# Patient Record
Sex: FEMALE | Race: WHITE | NOT HISPANIC OR LATINO | Employment: FULL TIME | URBAN - METROPOLITAN AREA
[De-identification: names, ages, dates, MRNs, and addresses within clinical notes are randomized per-mention and may not be internally consistent; named-entity substitution may affect disease eponyms.]

---

## 2017-05-17 ENCOUNTER — GENERIC CONVERSION - ENCOUNTER (OUTPATIENT)
Dept: OTHER | Facility: OTHER | Age: 42
End: 2017-05-17

## 2017-05-24 ENCOUNTER — GENERIC CONVERSION - ENCOUNTER (OUTPATIENT)
Dept: OTHER | Facility: OTHER | Age: 42
End: 2017-05-24

## 2018-02-14 ENCOUNTER — HOSPITAL ENCOUNTER (EMERGENCY)
Facility: HOSPITAL | Age: 43
Discharge: HOME | End: 2018-02-14
Attending: EMERGENCY MEDICINE | Admitting: EMERGENCY MEDICINE

## 2018-02-14 ENCOUNTER — APPOINTMENT (EMERGENCY)
Dept: RADIOLOGY | Facility: HOSPITAL | Age: 43
End: 2018-02-14

## 2018-02-14 VITALS
SYSTOLIC BLOOD PRESSURE: 170 MMHG | RESPIRATION RATE: 18 BRPM | DIASTOLIC BLOOD PRESSURE: 82 MMHG | WEIGHT: 160 LBS | TEMPERATURE: 98.4 F | BODY MASS INDEX: 26.63 KG/M2 | HEART RATE: 74 BPM | OXYGEN SATURATION: 99 %

## 2018-02-14 DIAGNOSIS — I10 HYPERTENSION: ICD-10-CM

## 2018-02-14 DIAGNOSIS — S89.92XA INJURY OF LEFT KNEE, INITIAL ENCOUNTER: Primary | ICD-10-CM

## 2018-02-14 DIAGNOSIS — M19.90 OSTEOARTHRITIS: ICD-10-CM

## 2018-02-14 PROCEDURE — 73562 X-RAY EXAM OF KNEE 3: CPT

## 2018-02-14 PROCEDURE — 99283 EMERGENCY DEPT VISIT LOW MDM: CPT

## 2018-02-14 RX ORDER — AMLODIPINE BESYLATE 2.5 MG/1
5 TABLET ORAL DAILY
Qty: 30 TABLET | Refills: 0 | Status: SHIPPED | OUTPATIENT
Start: 2018-02-14 | End: 2018-02-14

## 2018-02-14 RX ORDER — AMLODIPINE BESYLATE 5 MG/1
5 TABLET ORAL DAILY
Qty: 30 TABLET | Refills: 0 | Status: SHIPPED | OUTPATIENT
Start: 2018-02-14 | End: 2021-03-24 | Stop reason: ALTCHOICE

## 2018-02-14 RX ORDER — OXYCODONE HYDROCHLORIDE AND ACETAMINOPHEN 5; 325 MG/1; MG/1
1 TABLET ORAL EVERY 4 HOURS PRN
Qty: 15 TABLET | Refills: 0 | Status: SHIPPED | OUTPATIENT
Start: 2018-02-14 | End: 2018-02-19

## 2018-02-14 RX ORDER — NAPROXEN 500 MG/1
500 TABLET ORAL 2 TIMES DAILY WITH MEALS
Qty: 20 TABLET | Refills: 0 | Status: SHIPPED | OUTPATIENT
Start: 2018-02-14 | End: 2021-03-24 | Stop reason: ALTCHOICE

## 2018-02-14 NOTE — ED PROVIDER NOTES
History  Chief Complaint   Patient presents with    Knee Pain     Pt reports left knee pain starting Saturday  Hx of ACL reconstruction "many years ago" denies injury/trauma     53-year-old female presents for evaluation of left knee pain  She states that she had ACL injury and reconstruction approximately 20 years ago, 10 years ago she needed a revision  She has not followed up with an orthopedist in years  She denies any acute injury to her knee however on Saturday she started to have pain with ambulation, pain with any pressure to the knee especially in the medial aspect of it  There is no discoloration, no neurovascular dysfunction noted  She states that there is more inflammation around the screw on the medial tibia  No fevers or chills, no redness, no joint effusion is noted  None       Past Medical History:   Diagnosis Date    Cancer [facility])     breast    Hypertension        History reviewed  No pertinent surgical history  No family history on file  I have reviewed and agree with the history as documented  Social History   Substance Use Topics    Smoking status: Current Every Day Smoker     Packs/day: 0 50    Smokeless tobacco: Never Used    Alcohol use No        Review of Systems   Constitutional: Negative for chills and fever  Respiratory: Negative for cough, chest tightness and shortness of breath  Cardiovascular: Negative for chest pain and palpitations  Gastrointestinal: Negative for abdominal pain, nausea and vomiting  Musculoskeletal: Positive for arthralgias (Left knee) and gait problem  Negative for back pain, joint swelling and neck pain  Skin: Negative for color change, rash and wound  Allergic/Immunologic: Negative for immunocompromised state  Neurological: Negative for dizziness, syncope, weakness, numbness and headaches         Physical Exam  ED Triage Vitals [02/14/18 1241]   Temperature Pulse Respirations Blood Pressure SpO2   98 4 °F (36 9 °C) 88 16 (!) 203/100 100 %      Temp Source Heart Rate Source Patient Position - Orthostatic VS BP Location FiO2 (%)   Oral Monitor Sitting Left arm --      Pain Score       8           Orthostatic Vital Signs  Vitals:    02/14/18 1241 02/14/18 1517   BP: (!) 203/100 170/82   Pulse: 88 74   Patient Position - Orthostatic VS: Sitting Lying       Physical Exam   Constitutional: She is oriented to person, place, and time  She appears well-developed and well-nourished  No distress  HENT:   Head: Normocephalic and atraumatic  Mouth/Throat: Oropharynx is clear and moist    Eyes: Conjunctivae and EOM are normal    Neck: Normal range of motion  Cardiovascular: Normal rate and regular rhythm  Pulmonary/Chest: Effort normal  No respiratory distress  Musculoskeletal: She exhibits tenderness (L knee - tenderness over medial meniscus  normal ROM but pain w pressure to the knee  Drawer tests are normal)  She exhibits no edema or deformity  Neurological: She is alert and oriented to person, place, and time  No cranial nerve deficit or sensory deficit  Skin: Skin is warm and dry  She is not diaphoretic  No pallor  No skin color change over the affected knee  There is evidence of prior knee surgery  Psychiatric: She has a normal mood and affect  Her behavior is normal    Vitals reviewed  ED Medications  Medications - No data to display    Diagnostic Studies  Results Reviewed     None                 XR knee 3 views left non injury   ED Interpretation by Rebekah Mendez DO (02/14 0248)    Trace joint effusion and prepatellar soft tissue swelling  No fracture or malalignment        2  Postsurgical changes from prior ACL repair  Moderate posttraumatic degenerative changes  No anterior tibial translation to suggest graft laxity  Final Result by Ian Bravo MD (02/14 1463)      1  Trace joint effusion and prepatellar soft tissue swelling  No fracture or malalignment        2   Postsurgical changes from prior ACL repair  Moderate posttraumatic degenerative changes  No anterior tibial translation to suggest graft laxity  Workstation performed: [id number]                    Procedures  Procedures       Phone Contacts  ED Phone Contact    ED Course  ED Course as of Feb 14 1753 Wed Feb 14, 2018   1505 Repeat blood pressure is 168/101  Patient states she is supposed to be on blood pressure medication however she has not been taking it  She will be started on Norvasc as she did have a reaction to lisinopril  Also advised to follow up with primary care provider  MDM  Number of Diagnoses or Management Options  Hypertension:   Injury of left knee, initial encounter:   Osteoarthritis:   Diagnosis management comments: Injury to the left knee  Prior ACL injury  Imaging will be performed however this patient will require follow up with Orthopedics for MRI  No acute osseous abnormality on x-ray  Patient pain control, crutches, she will follow up with Orthopedics  Advised for hypertension here, she will be started on Norvasc and advised follow-up with a primary care provider, Odalys Read  Discussed with patient and they understood the risks and benefits of discharge  Patient had opportunity to ask questions regarding care and discharge instructions and had no further questions  Advised follow up with PCP, advised returning if worsening, and discussed disease specific return precautions  Patient understood discharge instructions            Amount and/or Complexity of Data Reviewed  Tests in the radiology section of CPT®: ordered and reviewed      CritCare Time    Disposition  Final diagnoses:   Injury of left knee, initial encounter   Osteoarthritis   Hypertension     Time reflects when diagnosis was documented in both MDM as applicable and the Disposition within this note     Time User Action Codes Description Comment    2/14/2018  2:44 PM Gianna Bill Add [S89 92XA] Injury of left knee, initial encounter     2/14/2018  2:44 PM Ritu Oh Add [M19 90] Osteoarthritis     2/14/2018  3:06 PM Mark Navarro Hypertension       ED Disposition     ED Disposition Condition Comment    Discharge  [facility] discharge to home/self care  Condition at discharge: Good        Follow-up Information     Follow up With Specialties Details Why Contact Info Additional Information    [facility] Emergency Department Emergency Medicine  If symptoms worsen: severe pain, swelling, redness, worsening condition, etc [address] [place] [address] ED, [place], [place], [address] [facility], [place] Orthopedic Surgery Schedule an appointment as soon as possible for a visit for [facility] follow up regarding knee - further imaging if needed [address]  [facility] [address] [facility]           Discharge Medication List as of 2/14/2018  3:08 PM      START taking these medications    Details   amLODIPine (NORVASC) 5 mg tablet Take 1 tablet (5 mg total) by mouth daily, Starting Wed 2/14/2018, Print      naproxen (NAPROSYN) 500 mg tablet Take 1 tablet (500 mg total) by mouth 2 (two) times a day with meals for 10 days, Starting Wed 2/14/2018, Until Sat 2/24/2018, Print      oxyCODONE-acetaminophen (PERCOCET) 5-325 mg per tablet Take 1 tablet by mouth every 4 (four) hours as needed for severe pain for up to 5 days Max Daily Amount: 6 tablets, Starting Wed 2/14/2018, Until Mon 2/19/2018, Print           No discharge procedures on file      ED Provider  Electronically Signed by           Shannon Ryder DO  02/14/18 5910

## 2018-02-14 NOTE — DISCHARGE INSTRUCTIONS
Chronic Hypertension, Ambulatory Care   GENERAL INFORMATION:   Chronic hypertension  is a long-term condition in which your blood pressure (BP) is higher than normal  Your BP is the force of your blood moving against the walls of your arteries  Hypertension is a BP of 140/90 or higher  Common symptoms include the following:   · Headache     · Blurred vision    · Chest pain     · Dizziness or weakness     · Trouble breathing     · Nosebleeds  Seek immediate care for the following symptoms:   · Severe headache or vision loss    · Weakness in an arm or leg    · Confusion or difficulty speaking    · Discomfort in your chest that feels like squeezing, pressure, fullness, or pain    · Suddenly feeling lightheaded or trouble breathing    · Pain or discomfort in your back, neck, jaw, stomach, or arm  Treatment for chronic hypertension  may include medicine to lower your BP  You may also need to make lifestyle changes  Take your medicine exactly as directed  Manage chronic hypertension:   · Take your BP at home  Sit and rest for 5 minutes before you take your BP  Extend your arm and support it on a flat surface  Your arm should be at the same level as your heart  Follow the directions that came with your BP monitor  If possible, take at least 2 BP readings each time  Take your BP at least twice a day at the same times each day, such as morning and evening  Keep a log of your BP readings and bring it to your follow-up visits  · Eat less sodium (salt)  Do not add sodium to your food  Limit foods that are high in sodium, such as canned foods, potato chips, and cold cuts  Your healthcare provider may suggest that you follow the [address] [place] [address] Street  The plan is low in sodium, unhealthy fats, and total fat  It is high in potassium, calcium, and fiber  · Exercise regularly  Exercise at least 30 minutes per day, on most days of the week  This will help decrease your BP   Ask your healthcare provider about the best exercise plan for you  · Limit alcohol  Women should limit alcohol to 1 drink a day  Men should limit alcohol to 2 drinks a day  A drink of alcohol is 12 ounces of beer, 5 ounces of wine, or 1½ ounces of liquor  · Do not smoke  If you smoke, it is never too late to quit  Smoking can increase your BP  Smoking also worsens other health conditions you may have that can increase your risk for hypertension  Ask your healthcare provider for information if you need help quitting  Follow up with your healthcare provider as directed: You will need to return to have your BP checked and to have other lab tests done  Write down your questions so you remember to ask them during your visits  CARE AGREEMENT:   You have the right to help plan your care  Learn about your health condition and how it may be treated  Discuss treatment options with your caregivers to decide what care you want to receive  You always have the right to refuse treatment  The above information is an  only  It is not intended as medical advice for individual conditions or treatments  Talk to your doctor, nurse or pharmacist before following any medical regimen to see if it is safe and effective for you  © 2014 [address] is for End User's use only and may not be sold, redistributed or otherwise used for commercial purposes  All illustrations and images included in CareNotes® are the copyrighted property of [employer]  or Isaiah Bolaños  Osteoarthritis   WHAT YOU NEED TO KNOW:   Osteoarthritis (OA) occurs when cartilage (tissue that cushions a joint) wears away slowly and causes the bones to rub together  OA is a long-term condition that often affects the hands, neck, lower back, knees, and hips  OA is also called arthrosis or degenerative joint disease  DISCHARGE INSTRUCTIONS:   Return to the emergency department if:   · You have severe pain  · You cannot move your joint    Contact your healthcare provider if:   · You have a fever  · Your joint is red and tender  · You have questions or concerns about your condition or care  Medicines:   · Acetaminophen  is used to decrease pain  It is available without a doctor's order  Ask how much to take and how often to take it  Follow directions  Acetaminophen can cause liver damage if not taken correctly  · NSAIDs , such as ibuprofen, help decrease swelling, pain, and fever  This medicine is available with or without a doctor's order  NSAIDs can cause stomach bleeding or kidney problems in certain people  If you take blood thinner medicine, always ask your healthcare provider if NSAIDs are safe for you  Always read the medicine label and follow directions  · Prescription pain medicine  may be given to decrease severe pain if other medicines do not work  Take the medicine as directed  Do not wait until the pain is severe before you take your medicine  · Take your medicine as directed  Contact your healthcare provider if you think your medicine is not helping or if you have side effects  Tell him or her if you are allergic to any medicine  Keep a list of the medicines, vitamins, and herbs you take  Include the amounts, and when and why you take them  Bring the list or the pill bottles to follow-up visits  Carry your medicine list with you in case of an emergency  Follow up with your healthcare provider as directed:  Write down your questions so you remember to ask them during your visits  Go to physical therapy as directed:  A physical therapist teaches you exercises to help improve movement and strength, and to decrease pain in your joints  The exercises also help lower your risk for loss of function  Manage your OA:   · Stay active  Physical activity may reduce your pain and improve your ability to do daily activities  Avoid activities that cause pain  Ask your healthcare provider what type of exercise would be best for you  · Maintain a healthy weight  This helps decrease the strain on the joints in your back, hips, knees, ankles, and feet  Ask your healthcare provider how much you should weigh  Ask him to help you create a weight loss plan if you are overweight  · Use heat or ice  on your joints as directed  Heat and ice help decrease pain, swelling, and muscle spasms  Use a heating pad on a low setting or take a warm bath  Use an ice pack, or put crushed ice in a plastic bag  Cover it with a towel  · Massage  the muscles around the joint to relieve pain and stiffness  · Use a cane, crutches, or a walker  to protect and relieve pressure on your ankle, knee, and hip joints  You may also be prescribed shoe inserts to decrease pressure in your joints  · Wear flat or low-heeled shoes  This will help decrease pain and reduce pressure on your ankle, knee, and hip joints  © 2017 [address] Information is for End User's use only and may not be sold, redistributed or otherwise used for commercial purposes  All illustrations and images included in CareNotes® are the copyrighted property of [employer]  or Isaiah Bolaños  The above information is an  only  It is not intended as medical advice for individual conditions or treatments  Talk to your doctor, nurse or pharmacist before following any medical regimen to see if it is safe and effective for you  Knee Arthroscopy   WHAT YOU NEED TO KNOW:   What do I need to know about a knee arthroscopy? A knee arthroscopy is a procedure to look inside your knee joint with an arthroscope  An arthroscope is a flexible tube with a light and camera on the end  A knee arthroscopy is usually done to check for disease or damage inside your knee  These problems may be fixed during the procedure  How do I prepare for a knee arthroscopy? · You may need an x-ray, ultrasound, or MRI before your procedure   These tests will take pictures of your joint and help your healthcare provider plan for your surgery  Arrange for someone to drive you home and stay with you for at least 24 hours after the procedure  · Your healthcare provider will talk to you about how to prepare for your procedure  He may tell you not to eat or drink anything after midnight on the day of your procedure  He will tell you what medicines to take or not take on the day of your procedure  You may be given an antibiotic through your IV to help prevent a bacterial infection  What will happen during a knee arthroscopy? · You may be given general anesthesia to keep you asleep and free from pain during surgery  You may instead be given spinal anesthesia to numb the surgery area  With spinal anesthesia, you may still feel pressure or pushing during surgery, but you should not feel any pain  · Your healthcare provider will make a small incision over your knee and insert the arthroscope  He may make 3 to 4 other small incisions in different places over your knee  Fluid will be injected into your knee to help your healthcare provider see things more clearly on the camera  Tools may be inserted through the incisions to fix problems in your knee  The incisions may be closed with stitches or strips of medical tape and covered with a bandage  What will happen after a knee arthroscopy? Healthcare providers will monitor you until you are awake  You may need an x-ray to look at your knee joint and monitor for complications  Do not get out of bed until your healthcare provider says it is okay  Do not put weight or pressure on your leg that was operated on  You may be able to go home when your pain is controlled or you may need to spend a night in the hospital    What are the risks of a knee arthroscopy? You may bleed more than expected or get an infection  You may have an allergic reaction to the anesthesia  You may have pain or knee stiffness   You may have swelling under your skin that prevents blood flow to the rest of your leg  You may need surgery to fix this problem  You may get a blood clot in your leg or arm  The clot may travel to your heart or brain and cause life-threatening problems, such as a heart attack or stroke  CARE AGREEMENT:   You have the right to help plan your care  Learn about your health condition and how it may be treated  Discuss treatment options with your caregivers to decide what care you want to receive  You always have the right to refuse treatment  The above information is an  only  It is not intended as medical advice for individual conditions or treatments  Talk to your doctor, nurse or pharmacist before following any medical regimen to see if it is safe and effective for you  © 2017 [facility] Information is for End User's use only and may not be sold, redistributed or otherwise used for commercial purposes  All illustrations and images included in CareNotes® are the copyrighted property of [employer]  or Isaiah Bolaños  Meniscus Tear   WHAT YOU NEED TO KNOW:   What is a meniscus tear? A meniscus tear is a tear in the cartilage of your knee  The meniscus is a piece of cartilage (strong tissue) between your thighbone and shinbone  The meniscus helps to cushion your knee joint and keep it stable  What causes a meniscus tear? A meniscus tear can occur if you twist your knee  A meniscus tear can happen during sports that involve squatting and twisting the knee, such as football or basketball  Weak and thinned meniscus cartilage in older people can increase the risk for a meniscus tear  What are the signs and symptoms of a meniscus tear? · A pop or tear when the injury happens    · Pain and swelling    · Tenderness    · Stiffness    · Popping, catching, or locking of your knee    · Not being able to extend your knee fully  How is a meniscus tear diagnosed? Your healthcare provider will examine your knee  He may bend your knee and then straighten and rotate it  He may also order x-rays and an MRI of your knee  An MRI takes pictures of your knee to show the meniscus tear  You may be given contrast liquid to help the tear show up better  Tell the healthcare provider if you have ever had an allergic reaction to contrast liquid  Do not enter the MRI room with anything metal  Metal can cause serious injury  Tell the healthcare provider if you have any metal in or on your body  How is a meniscus tear treated? Treatment depends on the type of tear you have  Some types of meniscus tears can heal on their own  You may need any of the following:  · NSAIDs , such as ibuprofen, help decrease swelling, pain, and fever  This medicine is available with or without a doctor's order  NSAIDs can cause stomach bleeding or kidney problems in certain people  If you take blood thinner medicine, always ask if NSAIDs are safe for you  Always read the medicine label and follow directions  Do not give these medicines to children under 10months of age without direction from your child's healthcare provider  · Rest  your knee  Avoid activities that make the swelling or pain worse  You may need to avoid putting weight on your leg while you have pain  Your healthcare provider may recommend that you use crutches  · Apply ice  on your knee for 15 to 20 minutes every hour or as directed  Use an ice pack, or put crushed ice in a plastic bag  Cover it with a towel  Ice helps prevent tissue damage and decreases swelling and pain  · Compress  your knee with an elastic bandage, air cast, medical boot, or splint to reduce swelling  Ask your healthcare provider which compression device to use, and how tight it should be  · Elevate  your knee above the level of your heart as often as you can  This will help decrease swelling and pain  Prop your knee on pillows or blankets to keep it elevated comfortably       · Surgery  may be needed if your symptoms do not improve  Your healthcare provider may trim away or repair damaged tissue  Call 911 for any of the following:   · Your arm or leg feels warm, tender, and painful  It may look swollen and red  When should I seek immediate care? · You cannot move your knee at all  When should I contact my healthcare provider? · Your symptoms do not improve with treatment  · You have questions or concerns about your condition or care  CARE AGREEMENT:   You have the right to help plan your care  Learn about your health condition and how it may be treated  Discuss treatment options with your caregivers to decide what care you want to receive  You always have the right to refuse treatment  The above information is an  only  It is not intended as medical advice for individual conditions or treatments  Talk to your doctor, nurse or pharmacist before following any medical regimen to see if it is safe and effective for you  © 2017 [address] [facility] Information is for End User's use only and may not be sold, redistributed or otherwise used for commercial purposes  All illustrations and images included in CareNotes® are the copyrighted property of [employer]  or Isaiah Bolaños  RICE Therapy   WHAT YOU NEED TO KNOW:   What is RICE therapy? RICE therapy is a 4-step process used to reduce swelling and pain from an injury  RICE stands for rest, ice, compression, and elevation  RICE should be done within the first 24 to 48 hours after an injury  How do I use RICE therapy? · Rest  the injured area so that it can heal  You may need to avoid putting any weight on your injury for at least 48 hours  Return to normal activities as directed  · Ice  the injury for 20 minutes every 4 hours, or as directed  Use an ice pack, or put crushed ice in a plastic bag  Cover it with a towel to protect your skin   Ice helps prevent tissue damage and decreases swelling and pain     · Compress  the injury with an elastic bandage, air cast, special boot, or splint to reduce swelling  Ask your healthcare provider which compression device to use, and how tight it should be  · Elevate  the injured area above the level of your heart as often as you can  This will help decrease swelling and pain  If possible, prop the injured area on pillows or blankets to keep it elevated comfortably  When should I contact my healthcare provider? · Your pain does not decrease, even after treatment  · You have questions or concerns about your condition or care  When should I seek immediate care? · You have severe pain in the injured area  · You have numbness in the injured area  · You cannot put any weight on or move the injured area  CARE AGREEMENT:   You have the right to help plan your care  Learn about your health condition and how it may be treated  Discuss treatment options with your caregivers to decide what care you want to receive  You always have the right to refuse treatment  The above information is an  only  It is not intended as medical advice for individual conditions or treatments  Talk to your doctor, nurse or pharmacist before following any medical regimen to see if it is safe and effective for you  © 2017 [address]  Information is for End User's use only and may not be sold, redistributed or otherwise used for commercial purposes  All illustrations and images included in CareNotes® are the copyrighted property of [employer]  or Isaiah Bolaños

## 2018-12-15 ENCOUNTER — HOSPITAL ENCOUNTER (EMERGENCY)
Facility: HOSPITAL | Age: 43
Discharge: HOME | End: 2018-12-15
Attending: EMERGENCY MEDICINE | Admitting: EMERGENCY MEDICINE

## 2018-12-15 VITALS
BODY MASS INDEX: 27.59 KG/M2 | RESPIRATION RATE: 18 BRPM | TEMPERATURE: 98.1 F | SYSTOLIC BLOOD PRESSURE: 196 MMHG | HEART RATE: 82 BPM | WEIGHT: 165.79 LBS | OXYGEN SATURATION: 99 % | DIASTOLIC BLOOD PRESSURE: 96 MMHG

## 2018-12-15 DIAGNOSIS — T30.0 BURN: Primary | ICD-10-CM

## 2018-12-15 PROCEDURE — 96374 THER/PROPH/DIAG INJ IV PUSH: CPT

## 2018-12-15 PROCEDURE — 96375 TX/PRO/DX INJ NEW DRUG ADDON: CPT

## 2018-12-15 PROCEDURE — 99283 EMERGENCY DEPT VISIT LOW MDM: CPT

## 2018-12-15 PROCEDURE — 96376 TX/PRO/DX INJ SAME DRUG ADON: CPT

## 2018-12-15 PROCEDURE — 96361 HYDRATE IV INFUSION ADD-ON: CPT

## 2018-12-15 RX ORDER — HYDROMORPHONE HCL/PF 1 MG/ML
1 SYRINGE (ML) INJECTION ONCE
Status: COMPLETED | OUTPATIENT
Start: 2018-12-15 | End: 2018-12-15

## 2018-12-15 RX ORDER — ONDANSETRON 2 MG/ML
4 INJECTION INTRAMUSCULAR; INTRAVENOUS ONCE
Status: COMPLETED | OUTPATIENT
Start: 2018-12-15 | End: 2018-12-15

## 2018-12-15 RX ORDER — SODIUM CHLORIDE 9 MG/ML
1000 INJECTION, SOLUTION INTRAVENOUS ONCE
Status: COMPLETED | OUTPATIENT
Start: 2018-12-15 | End: 2018-12-15

## 2018-12-15 RX ORDER — IBUPROFEN 200 MG
TABLET ORAL
Qty: 28 G | Refills: 0 | Status: SHIPPED | OUTPATIENT
Start: 2018-12-15 | End: 2021-03-24 | Stop reason: ALTCHOICE

## 2018-12-15 RX ORDER — HYDROCODONE BITARTRATE AND ACETAMINOPHEN 5; 325 MG/1; MG/1
1 TABLET ORAL EVERY 6 HOURS PRN
Qty: 12 TABLET | Refills: 0 | Status: SHIPPED | OUTPATIENT
Start: 2018-12-15 | End: 2018-12-18

## 2018-12-15 RX ORDER — ONDANSETRON 4 MG/1
4 TABLET, ORALLY DISINTEGRATING ORAL EVERY 6 HOURS PRN
Qty: 20 TABLET | Refills: 0 | Status: SHIPPED | OUTPATIENT
Start: 2018-12-15 | End: 2021-03-24 | Stop reason: ALTCHOICE

## 2018-12-15 RX ORDER — GINSENG 100 MG
1 CAPSULE ORAL ONCE
Status: COMPLETED | OUTPATIENT
Start: 2018-12-15 | End: 2018-12-15

## 2018-12-15 RX ADMIN — ONDANSETRON 4 MG: 2 INJECTION INTRAMUSCULAR; INTRAVENOUS at 12:11

## 2018-12-15 RX ADMIN — BACITRACIN ZINC 1 LARGE APPLICATION: 500 OINTMENT TOPICAL at 10:37

## 2018-12-15 RX ADMIN — HYDROMORPHONE HYDROCHLORIDE 1 MG: 1 INJECTION, SOLUTION INTRAMUSCULAR; INTRAVENOUS; SUBCUTANEOUS at 10:36

## 2018-12-15 RX ADMIN — ONDANSETRON 4 MG: 2 INJECTION INTRAMUSCULAR; INTRAVENOUS at 10:32

## 2018-12-15 RX ADMIN — SODIUM CHLORIDE 1000 ML/HR: 0.9 INJECTION, SOLUTION INTRAVENOUS at 10:34

## 2018-12-15 RX ADMIN — HYDROMORPHONE HYDROCHLORIDE 1 MG: 1 INJECTION, SOLUTION INTRAMUSCULAR; INTRAVENOUS; SUBCUTANEOUS at 12:11
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
## 2019-01-23 RX ORDER — BUPROPION HYDROCHLORIDE 150 MG/1
TABLET, EXTENDED RELEASE ORAL
COMMUNITY
Start: 2014-03-14 | End: 2022-04-05 | Stop reason: ALTCHOICE

## 2019-01-23 RX ORDER — LOSARTAN POTASSIUM AND HYDROCHLOROTHIAZIDE 25; 100 MG/1; MG/1
1 TABLET ORAL DAILY
COMMUNITY
Start: 2016-01-17 | End: 2019-01-24 | Stop reason: SDUPTHER

## 2019-01-24 ENCOUNTER — OFFICE VISIT (OUTPATIENT)
Dept: FAMILY MEDICINE CLINIC | Facility: CLINIC | Age: 44
End: 2019-01-24
Payer: COMMERCIAL

## 2019-01-24 VITALS
DIASTOLIC BLOOD PRESSURE: 100 MMHG | WEIGHT: 161 LBS | BODY MASS INDEX: 26.82 KG/M2 | TEMPERATURE: 98.5 F | HEART RATE: 101 BPM | SYSTOLIC BLOOD PRESSURE: 174 MMHG | HEIGHT: 65 IN | OXYGEN SATURATION: 97 %

## 2019-01-24 DIAGNOSIS — E78.2 MIXED HYPERLIPIDEMIA: ICD-10-CM

## 2019-01-24 DIAGNOSIS — I10 ESSENTIAL HYPERTENSION: Primary | ICD-10-CM

## 2019-01-24 PROBLEM — T31.0 BURN (ANY DEGREE) INVOLVING LESS THAN 10% OF BODY SURFACE: Status: ACTIVE | Noted: 2018-12-17

## 2019-01-24 PROBLEM — T22.211A SECOND DEGREE BURN OF RIGHT FOREARM: Status: ACTIVE | Noted: 2019-01-17

## 2019-01-24 PROBLEM — T23.252A SECOND DEGREE BURN OF PALM OF LEFT HAND: Status: ACTIVE | Noted: 2019-01-17

## 2019-01-24 PROCEDURE — 99213 OFFICE O/P EST LOW 20 MIN: CPT | Performed by: PHYSICIAN ASSISTANT

## 2019-01-24 PROCEDURE — 3008F BODY MASS INDEX DOCD: CPT | Performed by: PHYSICIAN ASSISTANT

## 2019-01-24 RX ORDER — LOSARTAN POTASSIUM AND HYDROCHLOROTHIAZIDE 25; 100 MG/1; MG/1
1 TABLET ORAL DAILY
Qty: 30 TABLET | Refills: 2 | Status: SHIPPED | OUTPATIENT
Start: 2019-01-24 | End: 2021-03-24 | Stop reason: SDUPTHER

## 2019-01-24 NOTE — PATIENT INSTRUCTIONS
Hypertension   AMBULATORY CARE:   Hypertension  is high blood pressure (BP)  Your BP is the force of your blood moving against the walls of your arteries  Normal BP is less than 120/80  Prehypertension is between 120/80 and 139/89  Hypertension is 140/90 or higher  Hypertension causes your BP to get so high that your heart has to work much harder than normal  This can damage your heart  You can control hypertension with a healthy lifestyle or medicines  A controlled blood pressure helps protect your organs, such as your heart, lungs, brain, and kidneys  Common symptoms include the following:   · Headache     · Blurred vision     · Chest pain     · Dizziness or weakness     · Trouble breathing    · Nosebleeds  Call 911 for any of the following:   · You have discomfort in your chest that feels like squeezing, pressure, fullness, or pain  · You become confused or have difficulty speaking  · You suddenly feel lightheaded or have trouble breathing  · You have pain or discomfort in your back, neck, jaw, stomach, or arm  Seek care immediately if:   · You have a severe headache or vision loss  · You have weakness in an arm or leg  Contact your healthcare provider if:   · You feel faint, dizzy, confused, or drowsy  · You have been taking your BP medicine and your BP is still higher than your healthcare provider says it should be  · You have questions or concerns about your condition or care  Treatment for hypertension  may include medicine to lower your BP and lower your cholesterol level  A low cholesterol level helps prevent heart disease and makes it easier to control your blood pressure  You may also need to make lifestyle changes  Take your medicine exactly as directed  Manage hypertension:  Talk with your healthcare provider about these and other ways to manage hypertension:  · Check your BP at home  Sit and rest for 5 minutes before you take your BP   Extend your arm and support it on a flat surface  Your arm should be at the same level as your heart  Follow the directions that came with your BP monitor  If possible, take at least 2 BP readings each time  Take your BP at least twice a day at the same times each day, such as morning and evening  Keep a record of your BP readings and bring it to your follow-up visits  Ask your healthcare provider what your BP should be  · Limit sodium (salt) as directed  Too much sodium can affect your fluid balance  Check labels to find low-sodium or no-salt-added foods  Some low-sodium foods use potassium salts for flavor  Too much potassium can also cause health problems  Your healthcare provider will tell you how much sodium and potassium are safe for you to have in a day  He or she may recommend that you limit sodium to 2,300 mg a day  · Follow the meal plan recommended by your healthcare provider  A dietitian or your provider can give you more information on low-sodium plans or the DASH (Dietary Approaches to Stop Hypertension) eating plan  The DASH plan is low in sodium, unhealthy fats, and total fat  It is high in potassium, calcium, and fiber  · Exercise to maintain a healthy weight  Exercise at least 30 minutes per day, on most days of the week  This will help decrease your blood pressure  Ask your healthcare provider about the best exercise plan for you  · Decrease stress  This may help lower your BP  Learn ways to relax, such as deep breathing or listening to music  · Limit alcohol  Women should limit alcohol to 1 drink a day  Men should limit alcohol to 2 drinks a day  A drink of alcohol is 12 ounces of beer, 5 ounces of wine, or 1½ ounces of liquor  · Do not smoke  Nicotine and other chemicals in cigarettes and cigars can increase your BP and also cause lung damage  Ask your healthcare provider for information if you currently smoke and need help to quit  E-cigarettes or smokeless tobacco still contain nicotine  Talk to your healthcare provider before you use these products  · Manage any other health conditions you have  Health conditions such as diabetes can increase your risk for hypertension  Follow your healthcare provider's instructions and take all your medicines as directed  Follow up with your healthcare provider as directed: You will need to return to have your BP checked and to have other lab tests done  Write down your questions so you remember to ask them during your visits  © 2017 [employer] Information is for End User's use only and may not be sold, redistributed or otherwise used for commercial purposes  All illustrations and images included in CareNotes® are the copyrighted property of [employer]  or Isaiah Bolaños  The above information is an  only  It is not intended as medical advice for individual conditions or treatments  Talk to your doctor, nurse or pharmacist before following any medical regimen to see if it is safe and effective for you
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
## 2019-10-29 PROBLEM — F33.42 RECURRENT MAJOR DEPRESSIVE DISORDER, IN FULL REMISSION (HCC): Status: ACTIVE | Noted: 2019-10-29

## 2021-03-24 ENCOUNTER — HOSPITAL ENCOUNTER (OUTPATIENT)
Dept: RADIOLOGY | Facility: HOSPITAL | Age: 46
Discharge: HOME | End: 2021-03-24
Payer: COMMERCIAL

## 2021-03-24 ENCOUNTER — OFFICE VISIT (OUTPATIENT)
Dept: FAMILY MEDICINE CLINIC | Facility: CLINIC | Age: 46
End: 2021-03-24
Payer: COMMERCIAL

## 2021-03-24 VITALS
BODY MASS INDEX: 27.39 KG/M2 | HEART RATE: 100 BPM | WEIGHT: 164.4 LBS | OXYGEN SATURATION: 100 % | DIASTOLIC BLOOD PRESSURE: 92 MMHG | SYSTOLIC BLOOD PRESSURE: 152 MMHG | HEIGHT: 65 IN

## 2021-03-24 DIAGNOSIS — Z12.31 ENCOUNTER FOR SCREENING MAMMOGRAM FOR MALIGNANT NEOPLASM OF BREAST: ICD-10-CM

## 2021-03-24 DIAGNOSIS — I10 ESSENTIAL HYPERTENSION: ICD-10-CM

## 2021-03-24 DIAGNOSIS — M70.21 OLECRANON BURSITIS OF RIGHT ELBOW: ICD-10-CM

## 2021-03-24 DIAGNOSIS — Z11.4 SCREENING FOR HIV (HUMAN IMMUNODEFICIENCY VIRUS): ICD-10-CM

## 2021-03-24 DIAGNOSIS — Z23 ENCOUNTER FOR IMMUNIZATION: ICD-10-CM

## 2021-03-24 DIAGNOSIS — M70.21 OLECRANON BURSITIS OF RIGHT ELBOW: Primary | ICD-10-CM

## 2021-03-24 PROCEDURE — 99213 OFFICE O/P EST LOW 20 MIN: CPT | Performed by: PHYSICIAN ASSISTANT

## 2021-03-24 PROCEDURE — 3725F SCREEN DEPRESSION PERFORMED: CPT | Performed by: PHYSICIAN ASSISTANT

## 2021-03-24 PROCEDURE — 73080 X-RAY EXAM OF ELBOW: CPT

## 2021-03-24 PROCEDURE — 3008F BODY MASS INDEX DOCD: CPT | Performed by: PHYSICIAN ASSISTANT

## 2021-03-24 RX ORDER — PREDNISONE 10 MG/1
TABLET ORAL
Qty: 15 TABLET | Refills: 0 | Status: SHIPPED | OUTPATIENT
Start: 2021-03-24 | End: 2021-04-29 | Stop reason: ALTCHOICE

## 2021-03-24 RX ORDER — LOSARTAN POTASSIUM AND HYDROCHLOROTHIAZIDE 25; 100 MG/1; MG/1
1 TABLET ORAL DAILY
Qty: 30 TABLET | Refills: 2 | Status: SHIPPED | OUTPATIENT
Start: 2021-03-24 | End: 2022-04-15 | Stop reason: SDUPTHER

## 2021-03-24 NOTE — PROGRESS NOTES
Assessment/Plan:  BMI Counseling: Body mass index is 27 36 kg/m²  The BMI is above normal  Nutrition recommendations include encouraging healthy choices of fruits and vegetables, consuming healthier snacks, limiting drinks that contain sugar, moderation in carbohydrate intake, increasing intake of lean protein, reducing intake of saturated and trans fat and reducing intake of cholesterol  Exercise recommendations include exercising 3-5 times per week  No pharmacotherapy was ordered  Diagnoses and all orders for this visit:    Olecranon bursitis of right elbow  -     predniSONE 10 mg tablet; Five p o  Day 1, for p o  Day 2, 3 p o  Day 3, 2 p o  Day 4, 1 p o  Day 5  -     XR elbow 3+ vw right; Future    Encounter for immunization    Screening for HIV (human immunodeficiency virus)    Encounter for screening mammogram for malignant neoplasm of breast  -     Mammo screening bilateral w 3d & cad; Future    Essential hypertension  -     losartan-hydrochlorothiazide (HYZAAR) 100-25 MG per tablet; Take 1 tablet by mouth daily    Other orders  -     Cancel: PNEUMOCOCCAL POLYSACCHARIDE VACCINE 23-VALENT =>3YO SQ IM  -     Cancel: HIV 1/2 Antigen/Antibody (4th Generation) w Reflex [facility]; Future  -     Cancel: TDAP VACCINE GREATER THAN OR EQUAL TO 8YO IM            Subjective:      Patient ID: Rob Anderson is a 50 y o  female  Patient is here complaining of right elbow pain and swelling  She noticed the swelling 1st at the elbow and began having pain on Monday  She states it is more of a discomfort most of the times but if it is touched in a certain way it is painful  She states that after she uses it all day is more uncomfortable  Patient has not been taking her blood pressure medication  I will renew the medication in patient will follow-up for her annual physical in the near future        The following portions of the patient's history were reviewed and updated as appropriate:   She has a past medical history of Cancer ([facility] [address] ) and Hypertension  ,  does not have any pertinent problems on file  ,   has a past surgical history that includes Breast surgery; [place] lymph node biopsy; Breast lumpectomy;  section; and Anterior cruciate ligament repair  ,  family history includes Diabetes in her paternal grandmother; Hypertension in her father and mother; Melanoma in her mother; Other in her father; Pancreatic cancer in her father  ,   reports that she has been smoking  She has been smoking about 0 50 packs per day  She has never used smokeless tobacco  She reports current alcohol use  She reports that she does not use drugs  ,  is allergic to lisinopril     Current Outpatient Medications   Medication Sig Dispense Refill    buPROPion (WELLBUTRIN SR) 150 mg 12 hr tablet Take by mouth      losartan-hydrochlorothiazide (HYZAAR) 100-25 MG per tablet Take 1 tablet by mouth daily 30 tablet 2    predniSONE 10 mg tablet Five p o  Day 1, for p o  Day 2, 3 p o  Day 3, 2 p o  Day 4, 1 p o  Day 5 15 tablet 0    silver sulfadiazine (SILVADENE,SSD) 1 % cream APPLY TOPICALLY DAILY FOR 7 DAYS   1     No current facility-administered medications for this visit  Review of Systems   Constitutional: Negative for activity change and appetite change  Respiratory: Negative for chest tightness and shortness of breath  Cardiovascular: Negative for chest pain, palpitations and leg swelling  Musculoskeletal: Positive for arthralgias and joint swelling  Skin: Negative for color change, rash and wound  Neurological: Negative for dizziness, light-headedness, numbness and headaches  Objective:  Vitals:    21 0804   BP: 152/92   BP Location: Left arm   Patient Position: Sitting   Cuff Size: Adult   Pulse: 100   SpO2: 100%   Weight: 74 6 kg (164 lb 6 4 oz)   Height: 5' 5" (1 651 m)     Body mass index is 27 36 kg/m²  Physical Exam  Vitals signs and nursing note reviewed     Constitutional:       Appearance: Normal appearance  HENT:      Head: Normocephalic and atraumatic  Right Ear: External ear normal       Left Ear: External ear normal    Eyes:      Conjunctiva/sclera: Conjunctivae normal    Pulmonary:      Effort: Pulmonary effort is normal    Musculoskeletal: Normal range of motion  General: Swelling and tenderness present  Right elbow: She exhibits swelling  She exhibits normal range of motion  Tenderness found  Olecranon process tenderness noted  Arms:    Neurological:      Mental Status: She is alert and oriented to person, place, and time     Psychiatric:         Mood and Affect: Mood normal          Behavior: Behavior normal

## 2021-04-29 ENCOUNTER — OFFICE VISIT (OUTPATIENT)
Dept: FAMILY MEDICINE CLINIC | Facility: CLINIC | Age: 46
End: 2021-04-29
Payer: COMMERCIAL

## 2021-04-29 VITALS
BODY MASS INDEX: 27.22 KG/M2 | HEART RATE: 84 BPM | DIASTOLIC BLOOD PRESSURE: 90 MMHG | TEMPERATURE: 98.1 F | SYSTOLIC BLOOD PRESSURE: 150 MMHG | OXYGEN SATURATION: 98 % | WEIGHT: 163.4 LBS | HEIGHT: 65 IN

## 2021-04-29 DIAGNOSIS — Z12.4 PAP SMEAR FOR CERVICAL CANCER SCREENING: ICD-10-CM

## 2021-04-29 DIAGNOSIS — Z13.1 SCREENING FOR DIABETES MELLITUS: ICD-10-CM

## 2021-04-29 DIAGNOSIS — I10 ESSENTIAL HYPERTENSION: ICD-10-CM

## 2021-04-29 DIAGNOSIS — Z00.00 ANNUAL PHYSICAL EXAM: Primary | ICD-10-CM

## 2021-04-29 DIAGNOSIS — F33.42 RECURRENT MAJOR DEPRESSIVE DISORDER, IN FULL REMISSION (HCC): ICD-10-CM

## 2021-04-29 DIAGNOSIS — E78.2 MIXED HYPERLIPIDEMIA: ICD-10-CM

## 2021-04-29 DIAGNOSIS — Z13.6 SCREENING FOR CARDIOVASCULAR CONDITION: ICD-10-CM

## 2021-04-29 PROBLEM — T23.252A SECOND DEGREE BURN OF PALM OF LEFT HAND: Status: RESOLVED | Noted: 2019-01-17 | Resolved: 2021-04-29

## 2021-04-29 PROCEDURE — 4004F PT TOBACCO SCREEN RCVD TLK: CPT | Performed by: PHYSICIAN ASSISTANT

## 2021-04-29 PROCEDURE — 99396 PREV VISIT EST AGE 40-64: CPT | Performed by: PHYSICIAN ASSISTANT

## 2021-04-29 PROCEDURE — 3080F DIAST BP >= 90 MM HG: CPT | Performed by: PHYSICIAN ASSISTANT

## 2021-04-29 PROCEDURE — 3077F SYST BP >= 140 MM HG: CPT | Performed by: PHYSICIAN ASSISTANT

## 2021-04-29 PROCEDURE — 3725F SCREEN DEPRESSION PERFORMED: CPT | Performed by: PHYSICIAN ASSISTANT

## 2021-04-29 PROCEDURE — 3008F BODY MASS INDEX DOCD: CPT | Performed by: PHYSICIAN ASSISTANT

## 2021-04-29 NOTE — PROGRESS NOTES
[place] [address]    NAME: Greg Cole  AGE: 50 y o  SEX: female  : 1975     DATE: 2021     Assessment and Plan:     Problem List Items Addressed This Visit        Cardiovascular and Mediastinum    Essential hypertension    Relevant Orders    CBC and Platelet    Comprehensive metabolic panel    TSH, 3rd generation with Free T4 reflex       Other    Mixed hyperlipidemia    Relevant Orders    CBC and Platelet    Comprehensive metabolic panel    TSH, 3rd generation with Free T4 reflex    Lipid Panel with Direct LDL reflex    Recurrent major [facility] disorder, [facility] full [facility] )    Annual physical exam - Primary      Other Visit Diagnoses     Pap smear for cervical cancer screening        Relevant Orders    Ambulatory referral to Gynecology    Screening for diabetes mellitus        Screening for cardiovascular condition              Immunizations and preventive care screenings were discussed with patient today  Appropriate education was printed on patient's after visit summary  Counseling:  Dental Health: discussed importance of regular tooth brushing, flossing, and dental visits  Injury prevention: discussed safety/seat belts, safety helmets, smoke detectors, carbon dioxide detectors, and smoking near bedding or upholstery  · Exercise: the importance of regular exercise/physical activity was discussed  Recommend exercise 3-5 times per week for at least 30 minutes  Tobacco Cessation Counseling: Tobacco cessation counseling was provided  The patient is sincerely urged to quit consumption of tobacco  She is ready to quit tobacco  Medication options and side effects of medication discussed  Patient refused medication  No follow-ups on file       Chief Complaint:     Chief Complaint   Patient presents with    Annual Exam     Pt presents today for an annual physical exam  Pt states she has no concerns today  Pt would like to discuss covid vaccine and improvements in her right elbow  History of Present Illness:     Adult Annual Physical   Patient here for a comprehensive physical exam  The patient reports no problems  Pt was in [place] for 3 weeks and forgot to take her blood pressure medication with her  She just restarted  Pt will follow up for a BP check in about a month  She will get COVID vaccine after her blood pressure is better controlled  Diet and Physical Activity  · Diet/Nutrition: well balanced diet and limited junk food  · Exercise: no formal exercise  Depression Screening  PHQ-9 Depression Screening    PHQ-9:   Frequency of the following problems over the past two weeks:      Little interest or pleasure in doing things: 0 - not at all  Feeling down, depressed, or hopeless: 0 - not at all  Trouble falling or staying asleep, or sleeping too much: 0 - not at all  Feeling tired or having little energy: 0 - not at all  Poor appetite or overeatin - not at all  Feeling bad about yourself - or that you are a failure or have let yourself or your family down: 0 - not at all  Trouble concentrating on things, such as reading the newspaper or watching television: 0 - not at all  Moving or speaking so slowly that other people could have noticed  Or the opposite - being so fidgety or restless that you have been moving around a lot more than usual: 0 - not at all  Thoughts that you would be better off dead, or of hurting yourself in some way: 0 - not at all  PHQ-2 Score: 0  PHQ-9 Score: 0       General Health  · Sleep: sleeps poorly  · Hearing: normal - bilateral   · Vision: goes for regular eye exams and wears glasses  · Dental: regular dental visits         /GYN Health  · Patient is: postmenopausal, over 1 year  · Last menstrual period: NA  · Contraceptive method: NA      Review of Systems:     Review of Systems   Constitutional: Negative for activity change, appetite change, fatigue and fever  HENT: Negative for congestion, ear pain, postnasal drip, sneezing, sore throat and trouble swallowing  Eyes: Negative for pain  Respiratory: Negative for cough, chest tightness, shortness of breath and wheezing  Cardiovascular: Negative for chest pain, palpitations and leg swelling  Gastrointestinal: Negative for abdominal pain, constipation, diarrhea and nausea  Endocrine: Negative for heat intolerance, polydipsia, polyphagia and polyuria  Genitourinary: Negative for difficulty urinating, dysuria, flank pain and pelvic pain  Musculoskeletal: Negative for arthralgias, back pain, gait problem, joint swelling, myalgias and neck pain  Neurological: Negative for dizziness, seizures, syncope, light-headedness and headaches  Hematological: Negative for adenopathy  Does not bruise/bleed easily  Psychiatric/Behavioral: Positive for sleep disturbance  Negative for decreased concentration, dysphoric mood and self-injury  The patient is not nervous/anxious         Past Medical History:     Past Medical History:   Diagnosis Date    [facility] )     breast    Hypertension       Past Surgical History:     Past Surgical History:   Procedure Laterality Date    ANTERIOR CRUCIATE LIGAMENT REPAIR      BREAST LUMPECTOMY      BREAST SURGERY       SECTION      SENTINEL LYMPH NODE BIOPSY        Social History:        Social History     Socioeconomic History    Marital status: /Civil Union     Spouse name: None    Number of children: None    Years of education: None    Highest education level: None   Occupational History     Employer: [employer]   Social Needs    Financial resource strain: None    Food insecurity     Worry: None     Inability: None    Transportation needs     Medical: None     Non-medical: None   Tobacco Use    Smoking status: Current Every Day Smoker     Packs/day: 0 50    Smokeless tobacco: Never Used   Substance and Sexual Activity  Alcohol use: Yes    Drug use: No    Sexual activity: None   Lifestyle    Physical activity     Days per week: None     Minutes per session: None    Stress: None   Relationships    Social connections     Talks on phone: None     Gets together: None     Attends [employer] service: None     Active member of club or organization: None     Attends meetings of clubs or organizations: None     Relationship status: None    Intimate partner violence     Fear of current or ex partner: None     Emotionally abused: None     Physically abused: None     Forced sexual activity: None   Other Topics Concern    None   Social History Narrative    Activities; Hiking    Uses seatbelts    Lives with family      Family History:     Family History   Problem Relation Age of Onset    Hypertension Mother     Melanoma Mother     Other Father         Brain Tumor    Hypertension Father     Pancreatic cancer Father     Diabetes Paternal Grandmother       Current Medications:     Current Outpatient Medications   Medication Sig Dispense Refill    losartan-hydrochlorothiazide (HYZAAR) 100-25 MG per tablet Take 1 tablet by mouth daily 30 tablet 2    buPROPion (WELLBUTRIN SR) 150 mg 12 hr tablet Take by mouth      silver sulfadiazine (SILVADENE,SSD) 1 % cream APPLY TOPICALLY DAILY FOR 7 DAYS   1     No current facility-administered medications for this visit  Allergies: Allergies   Allergen Reactions    Lisinopril Cough     Cough on ace      Physical Exam:     /90 (BP Location: Left arm, Patient Position: Sitting, Cuff Size: Adult)   Pulse 84   Temp 98 1 °F (36 7 °C) (Tympanic)   Ht 5' 5" (1 651 m)   Wt 74 1 kg (163 lb 6 4 oz)   SpO2 98%   BMI 27 19 kg/m²     Physical Exam  Vitals signs and nursing note reviewed  Constitutional:       Appearance: Normal appearance  She is normal weight  HENT:      Head: Normocephalic and atraumatic        Right Ear: Tympanic membrane, ear canal and external ear normal  Left Ear: Tympanic membrane, ear canal and external ear normal    Eyes:      Extraocular Movements: Extraocular movements intact  Conjunctiva/sclera: Conjunctivae normal    Neck:      Musculoskeletal: Normal range of motion and neck supple  Thyroid: No thyromegaly  Vascular: No carotid bruit  Cardiovascular:      Rate and Rhythm: Normal rate and regular rhythm  Pulses: Normal pulses  Heart sounds: Normal heart sounds  Pulmonary:      Effort: Pulmonary effort is normal       Breath sounds: Normal breath sounds  Abdominal:      General: Abdomen is flat  Bowel sounds are normal       Palpations: Abdomen is soft  There is no hepatomegaly, splenomegaly or mass  Tenderness: There is no abdominal tenderness  There is no right CVA tenderness or left CVA tenderness  Musculoskeletal: Normal range of motion  Right lower leg: No edema  Left lower leg: No edema  Lymphadenopathy:      Cervical: No cervical adenopathy  Skin:     General: Skin is warm and dry  Neurological:      General: No focal deficit present  Mental Status: She is alert and oriented to person, place, and time  Psychiatric:         Mood and Affect: Mood normal          Behavior: Behavior normal          Thought Content:  Thought content normal          Judgment: Judgment normal           GARRETT Wilson [id number]

## 2021-04-29 NOTE — PATIENT INSTRUCTIONS
Wellness Visit for Adults   AMBULATORY CARE:   A wellness visit  is when you see your healthcare provider to get screened for health problems  Your healthcare provider will also give you advice on how to stay healthy  Write down your questions so you remember to ask them  Ask your healthcare provider how often you should have a wellness visit  What happens at a wellness visit:  Your healthcare provider will ask about your health, and your family history of health problems  This includes high blood pressure, heart disease, and cancer  He or she will ask if you have symptoms that concern you, if you smoke, and about your mood  You may also be asked about your intake of medicines, supplements, food, and alcohol  Any of the following may be done:  · Your weight  will be checked  Your height may also be checked so your body mass index (BMI) can be calculated  Your BMI shows if you are at a healthy weight  · Your blood pressure  and heart rate will be checked  Your temperature may also be checked  · Blood and urine tests  may be done  Blood tests may be done to check your cholesterol levels  Abnormal cholesterol levels increase your risk for heart disease and stroke  You may also need a blood or urine test to check for diabetes if you are at increased risk  Urine tests may be done to look for signs of an infection or kidney disease  · A physical exam  includes checking your heartbeat and lungs with a stethoscope  Your healthcare provider may also check your skin to look for sun damage  · Screening tests  may be recommended  A screening test is done to check for diseases that may not cause symptoms  The screening tests you may need depend on your age, gender, family history, and lifestyle habits  For example, colorectal screening may be recommended if you are 48years old or older  Screening tests you need if you are a woman:   · A Pap smear  is used to screen for cervical cancer   Pap smears are usually done every 3 to 5 years depending on your age  You may need them more often if you have had abnormal Pap smear test results in the past  Ask your healthcare provider how often you should have a Pap smear  · A mammogram  is an x-ray of your breasts to screen for breast cancer  Experts recommend mammograms every 2 years starting at age 48 years  You may need a mammogram at age 52 years or younger if you have an increased risk for breast cancer  Talk to your healthcare provider about when you should start having mammograms and how often you need them  Vaccines you may need:   · Get an influenza vaccine  every year  The influenza vaccine protects you from the flu  Several types of viruses cause the flu  The viruses change over time, so new vaccines are made each year  · Get a tetanus-diphtheria (Td) booster vaccine  every 10 years  This vaccine protects you against tetanus and diphtheria  Tetanus is a severe infection that may cause painful muscle spasms and lockjaw  Diphtheria is a severe bacterial infection that causes a thick covering in the back of your mouth and throat  · Get a human papillomavirus (HPV) vaccine  if you are female and aged 23 to 32 or male 23 to 24 and never received it  This vaccine protects you from HPV infection  HPV is the most common infection spread by sexual contact  HPV may also cause vaginal, penile, and anal cancers  · Get a pneumococcal vaccine  if you are aged 72 years or older  The pneumococcal vaccine is an injection given to protect you from pneumococcal disease  Pneumococcal disease is an infection caused by pneumococcal bacteria  The infection may cause pneumonia, meningitis, or an ear infection  · Get a shingles vaccine  if you are 60 or older, even if you have had shingles before  The shingles vaccine is an injection to protect you from the varicella-zoster virus  This is the same virus that causes chickenpox   Shingles is a painful rash that develops in people who had chickenpox or have been exposed to the virus  How to eat healthy:  My Plate is a model for planning healthy meals  It shows the types and amounts of foods that should go on your plate  Fruits and vegetables make up about half of your plate, and grains and protein make up the other half  A serving of dairy is included on the side of your plate  The amount of calories and serving sizes you need depends on your age, gender, weight, and height  Examples of healthy foods are listed below:  · Eat a variety of vegetables  such as dark green, red, and orange vegetables  You can also include canned vegetables low in sodium (salt) and frozen vegetables without added butter or sauces  · Eat a variety of fresh fruits , canned fruit in 100% juice, frozen fruit, and dried fruit  · Include whole grains  At least half of the grains you eat should be whole grains  Examples include whole-wheat bread, wheat pasta, brown rice, and whole-grain cereals such as oatmeal     · Eat a variety of protein foods such as seafood (fish and shellfish), lean meat, and poultry without skin (turkey and chicken)  Examples of lean meats include pork leg, shoulder, or tenderloin, and beef round, sirloin, tenderloin, and extra lean ground beef  Other protein foods include eggs and egg substitutes, beans, peas, soy products, nuts, and seeds  · Choose low-fat dairy products such as skim or 1% milk or low-fat yogurt, cheese, and cottage cheese  · Limit unhealthy fats  such as butter, hard margarine, and shortening  Exercise:  Exercise at least 30 minutes per day on most days of the week  Some examples of exercise include walking, biking, dancing, and swimming  You can also fit in more physical activity by taking the stairs instead of the elevator or parking farther away from stores  Include muscle strengthening activities 2 days each week  Regular exercise provides many health benefits   It helps you manage your weight, and decreases your risk for type 2 diabetes, heart disease, stroke, and high blood pressure  Exercise can also help improve your mood  Ask your healthcare provider about the best exercise plan for you  General health and safety guidelines:   · Do not smoke  Nicotine and other chemicals in cigarettes and cigars can cause lung damage  Ask your healthcare provider for information if you currently smoke and need help to quit  E-cigarettes or smokeless tobacco still contain nicotine  Talk to your healthcare provider before you use these products  · Limit alcohol  A drink of alcohol is 12 ounces of beer, 5 ounces of wine, or 1½ ounces of liquor  · Lose weight, if needed  Being overweight increases your risk of certain health conditions  These include heart disease, high blood pressure, type 2 diabetes, and certain types of cancer  · Protect your skin  Do not sunbathe or use tanning beds  Use sunscreen with a SPF 15 or higher  Apply sunscreen at least 15 minutes before you go outside  Reapply sunscreen every 2 hours  Wear protective clothing, hats, and sunglasses when you are outside  · Drive safely  Always wear your seatbelt  Make sure everyone in your car wears a seatbelt  A seatbelt can save your life if you are in an accident  Do not use your cell phone when you are driving  This could distract you and cause an accident  Pull over if you need to make a call or send a text message  · Practice safe sex  Use latex condoms if are sexually active and have more than one partner  Your healthcare provider may recommend screening tests for sexually transmitted infections (STIs)  · Wear helmets, lifejackets, and protective gear  Always wear a helmet when you ride a bike or motorcycle, go skiing, or play sports that could cause a head injury  Wear protective equipment when you play sports  Wear a lifejacket when you are on a boat or doing water sports      © Copyright [employer] 2020 Information is for End User's use only and may not be sold, redistributed or otherwise used for commercial purposes  All illustrations and images included in CareNotes® are the copyrighted property of A D A M [employer] Inc  or Manuel Barros  The above information is an  only  It is not intended as medical advice for individual conditions or treatments  Talk to your doctor, nurse or pharmacist before following any medical regimen to see if it is safe and effective for you  Cigarette Smoking and Your Health   AMBULATORY CARE:   Risks to your health if you smoke:  Nicotine and other chemicals found in tobacco and e-cigarettes can damage every cell in your body  Even if you are a light smoker, you have an increased risk for cancer, heart disease, and lung disease  If you are pregnant or have diabetes, smoking increases your risk for complications  Nicotine can affect an adolescent's developing brain  This can lead to trouble thinking, learning, or paying attention  Benefits to your health if you stop smoking:   · You decrease respiratory symptoms such as coughing, wheezing, and shortness of breath  · You reduce your risk for cancers of the lung, mouth, throat, kidney, bladder, pancreas, stomach, and cervix  If you already have cancer, you increase the benefits of chemotherapy  You also reduce your risk for cancer returning or a second cancer from developing  · You reduce your risk for heart disease, blood clots, heart attack, and stroke  · You reduce your risk for lung infections, and diseases such as pneumonia, asthma, chronic bronchitis, and emphysema  · Your circulation improves  More oxygen can be delivered to your body  If you have diabetes, you lower your risk for complications, such as kidney, artery, and eye diseases  You also lower your risk for nerve damage  Nerve damage can lead to amputations, poor vision, and blindness      · You improve your body's ability to heal and to fight infections  · An adolescent can help his or her brain and body develop in a healthy way  Talk to your adolescent about all the health risks of nicotine  If you can, start talking about nicotine when your child is younger than 12 years  This may make it easier for him or her not to start using nicotine as a teenager or adult  Explain to him or her that it is best never to start  It can be hard to try to quit later  Benefits to the health of others if you stop smoking:  Tobacco is harmful to nonsmokers who breathe in your secondhand smoke  The following are ways the health of others around you may improve when you stop smoking:  · You lower the risks for lung cancer and heart disease in nonsmoking adults  · If you are pregnant, you lower the risk for miscarriage, early delivery, low birth weight, and stillbirth  You also lower your baby's risk for SIDS, obesity, developmental delay, and neurobehavioral problems, such as ADHD  · If you have children, you lower their risk for ear infections, colds, pneumonia, bronchitis, and asthma  Follow up with your doctor as directed:  Write down your questions so you remember to ask them during your visits  For more information and support to stop smoking:   · [employer]  gov  Phone: 4- 151 - 677-6057  Web Address: www [employer]  [address] Information is for End User's use only and may not be sold, redistributed or otherwise used for commercial purposes  All illustrations and images included in CareNotes® are the copyrighted property of [employer] A M , Inc  or [facility] Deysi Najera   The above information is an  only  It is not intended as medical advice for individual conditions or treatments  Talk to your doctor, nurse or pharmacist before following any medical regimen to see if it is safe and effective for you  Cholesterol and Your Health   AMBULATORY CARE:   Cholesterol  is a waxy, fat-like substance   Your body uses cholesterol to make hormones and new cells, and to protect nerves  Cholesterol is made by your body  It also comes from certain foods you eat, such as meat and dairy products  Your healthcare provider can help you set goals for your cholesterol levels  He or she can help you create a plan to meet your goals  Cholesterol level goals: Your cholesterol level goals depend on your risk for heart disease, your age, and your other health conditions  The following are general guidelines:  · Total cholesterol  includes low-density lipoprotein (LDL), high-density lipoprotein (HDL), and triglyceride levels  The total cholesterol level should be lower than 200 mg/dL and is best at about 150 mg/dL  · LDL cholesterol  is called bad cholesterol  because it forms plaque in your arteries  As plaque builds up, your arteries become narrow, and less blood flows through  When plaque decreases blood flow to your heart, you may have chest pain  If plaque completely blocks an artery that brings blood to your heart, you may have a heart attack  Plaque can break off and form blood clots  Blood clots may block arteries in your brain and cause a stroke  The level should be less than 130 mg/dL and is best at about 100 mg/dL  · HDL cholesterol  is called good cholesterol  because it helps remove LDL cholesterol from your arteries  It does this by attaching to LDL cholesterol and carrying it to your liver  Your liver breaks down LDL cholesterol so your body can get rid of it  High levels of HDL cholesterol can help prevent a heart attack and stroke  Low levels of HDL cholesterol can increase your risk for heart disease, heart attack, and stroke  The level should be 60 mg/dL or higher  · Triglycerides  are a type of fat that store energy from foods you eat  High levels of triglycerides also cause plaque buildup  This can increase your risk for a heart attack or stroke   If your triglyceride level is high, your LDL cholesterol level may also be high  The level should be less than 150 mg/dL  What increases your risk for high cholesterol:   · Smoking cigarettes    · Being overweight or obese, or not getting enough exercise    · Drinking large amounts of alcohol    · A medical condition such as hypertension (high blood pressure) or diabetes    · Certain genes passed from your parents to you    · Age older than 65 years    What you need to know about having your cholesterol levels checked: Adults 21to 39years of age should have their cholesterol levels checked every 4 to 6 years  Adults 45 years or older should have their cholesterol checked every 1 to 2 years  You may need your cholesterol checked more often, or at a younger age, if you have risk factors for heart disease  You may also need to have your cholesterol checked more often if you have other health conditions, such as diabetes  Blood tests are used to check cholesterol levels  Blood tests measure your levels of triglycerides, LDL cholesterol, and HDL cholesterol  How healthy fats affect your cholesterol levels:  Healthy fats, also called unsaturated fats, help lower LDL cholesterol and triglyceride levels  Healthy fats include the following:  · Monounsaturated fats  are found in foods such as olive oil, canola oil, avocado, nuts, and olives  · Polyunsaturated fats,  such as omega 3 fats, are found in fish, such as salmon, trout, and tuna  They can also be found in plant foods such as flaxseed, walnuts, and soybeans  How unhealthy fats affect your cholesterol levels:  Unhealthy fats increase LDL cholesterol and triglyceride levels  They are found in foods high in cholesterol, saturated fat, and trans fat:  · Cholesterol  is found in eggs, dairy, and meat  · Saturated fat  is found in butter, cheese, ice cream, whole milk, and coconut oil  Saturated fat is also found in meat, such as sausage, hot dogs, and bologna      · Trans fat  is found in liquid oils and is used in fried and baked foods  Foods that contain trans fats include chips, crackers, muffins, sweet rolls, microwave popcorn, and cookies  Treatment  for high cholesterol will also decrease your risk of heart disease, heart attack, and stroke  Treatment may include any of the following:  · Lifestyle changes  may include food, exercise, weight loss, and quitting smoking  You may also need to decrease the amount of alcohol you drink  Your healthcare provider will want you to start with lifestyle changes  Other treatment may be added if lifestyle changes are not enough  · Medicines  may be given to lower your LDL cholesterol, triglyceride levels, or total cholesterol level  You may need medicines to lower your cholesterol if any of the following is true:     ? You have a history of stroke, TIA, unstable angina, or a heart attack  ? Your LDL cholesterol level is 190 mg/dL or higher  ? You are age 36 to 76 years, have diabetes or heart disease risk factors, and your LDL cholesterol is 70 mg/dL or higher  · Supplements  include fish oil, red yeast rice, and garlic  Fish oil may help lower your triglyceride and LDL cholesterol levels  It may also increase your HDL cholesterol level  Red yeast rice may help decrease your total cholesterol level and LDL cholesterol level  Garlic may help lower your total cholesterol level  Do not take these supplements without talking to your healthcare provider  Food changes you can make to lower your cholesterol levels:  A dietitian can help you create a healthy eating plan  He or she can show you how to read food labels and choose foods low in saturated fat, trans fats, and cholesterol  · Decrease the total amount of fat you eat  Choose lean meats, fat-free or 1% fat milk, and low-fat dairy products, such as yogurt and cheese  Try to limit or avoid red meats  Limit or do not eat fried foods or baked goods, such as cookies  · Replace unhealthy fats with healthy fats    Cook foods in olive oil or canola oil  Choose soft margarines that are low in saturated fat and trans fat  Seeds, nuts, and avocados are other examples of healthy fats  · Eat foods with omega-3 fats  Examples include salmon, tuna, mackerel, walnuts, and flaxseed  Eat fish 2 times per week  Pregnant women should not eat fish that have high levels of mercury, such as shark, swordfish, and florencio mackerel  · Increase the amount of high-fiber foods you eat  High-fiber foods can help lower your LDL cholesterol  Aim to get between 20 and 30 grams of fiber each day  Fruits and vegetables are high in fiber  Eat at least 5 servings each day  Other high-fiber foods are whole-grain or whole-wheat breads, pastas, or cereals, and brown rice  Eat 3 ounces of whole-grain foods each day  Increase fiber slowly  You may have abdominal discomfort, bloating, and gas if you add fiber to your diet too quickly  · Eat healthy protein foods  Examples include low-fat dairy products, skinless chicken and turkey, fish, and nuts  · Limit foods and drinks that are high in sugar  Your dietitian or healthcare provider can help you create daily limits for high-sugar foods and drinks  The limit may be lower if you have diabetes or another health condition  Limits can also help you lose weight if needed  Lifestyle changes you can make to lower your cholesterol levels:   · Maintain a healthy weight  Ask your healthcare provider what a healthy weight is for you  Ask him or her to help you create a weight loss plan if needed  Weight loss can decrease your total cholesterol and triglyceride levels  Weight loss may also help keep your blood pressure at a healthy level  · Exercise regularly  Exercise can help lower your total cholesterol level and maintain a healthy weight  Exercise can also help increase your HDL cholesterol level  Work with your healthcare provider to create an exercise program that is right for you   Get at least 30 to 40 minutes of moderate exercise most days of the week  Examples of exercise include brisk walking, swimming, or biking  · Do not smoke  Nicotine and other chemicals in cigarettes and cigars can raise your cholesterol levels  Ask your healthcare provider for information if you currently smoke and need help to quit  E-cigarettes or smokeless tobacco still contain nicotine  Talk to your healthcare provider before you use these products  · Limit or do not drink alcohol  Alcohol can increase your triglyceride levels  Ask your healthcare provider before you drink alcohol  Ask how much is okay for you to drink in 1 day or 1 week  © Copyright [address] [place] [address] Drive Information is for End User's use only and may not be sold, redistributed or otherwise used for commercial purposes  All illustrations and images included in CareNotes® are the copyrighted property of [employer]  or [facility] Deysi Najera   The above information is an  only  It is not intended as medical advice for individual conditions or treatments  Talk to your doctor, nurse or pharmacist before following any medical regimen to see if it is safe and effective for you

## 2022-04-05 ENCOUNTER — OFFICE VISIT (OUTPATIENT)
Dept: FAMILY MEDICINE CLINIC | Facility: CLINIC | Age: 47
End: 2022-04-05
Payer: COMMERCIAL

## 2022-04-05 VITALS
SYSTOLIC BLOOD PRESSURE: 202 MMHG | TEMPERATURE: 99 F | HEART RATE: 94 BPM | OXYGEN SATURATION: 98 % | HEIGHT: 65 IN | DIASTOLIC BLOOD PRESSURE: 120 MMHG | BODY MASS INDEX: 26.66 KG/M2 | WEIGHT: 160 LBS

## 2022-04-05 DIAGNOSIS — I10 ESSENTIAL HYPERTENSION: Primary | ICD-10-CM

## 2022-04-05 DIAGNOSIS — J06.9 UPPER RESPIRATORY TRACT INFECTION, UNSPECIFIED TYPE: ICD-10-CM

## 2022-04-05 PROCEDURE — 99213 OFFICE O/P EST LOW 20 MIN: CPT | Performed by: PHYSICIAN ASSISTANT

## 2022-04-05 RX ORDER — METHYLPREDNISOLONE 4 MG/1
TABLET ORAL
COMMUNITY
Start: 2022-04-04 | End: 2022-04-10

## 2022-04-05 RX ORDER — FLUTICASONE PROPIONATE 50 MCG
SPRAY, SUSPENSION (ML) NASAL
COMMUNITY
Start: 2022-04-04 | End: 2022-04-15 | Stop reason: ALTCHOICE

## 2022-04-05 RX ORDER — AZITHROMYCIN 250 MG/1
TABLET, FILM COATED ORAL
COMMUNITY
Start: 2022-04-04 | End: 2022-04-15 | Stop reason: ALTCHOICE

## 2022-04-05 RX ORDER — BENZONATATE 200 MG/1
200 CAPSULE ORAL 3 TIMES DAILY PRN
COMMUNITY
Start: 2022-04-04 | End: 2022-04-11

## 2022-04-05 NOTE — PROGRESS NOTES
Assessment/Plan:          Diagnoses and all orders for this visit:    Essential hypertension  -     metoprolol tartrate (LOPRESSOR) 25 mg tablet; Take 1 tablet (25 mg total) by mouth every 12 (twelve) hours    Upper respiratory tract infection, unspecified type    Other orders  -     methylPREDNISolone 4 MG tablet therapy pack; Take as directed per package instructions  -     fluticasone (FLONASE) 50 mcg/act nasal spray  -     benzonatate (TESSALON) 200 MG capsule; Take 200 mg by mouth Three times daily as needed  -     azithromycin (ZITHROMAX) 250 mg tablet            Subjective:      Patient ID: Mariel Malik is a 48 y o  female  Patient is here as follow-up from urgent care for her elevated blood pressure  She was seen there for upper respiratory infection  She was prescribed an antibiotic, prednisone, Flonase, and Tessalon Perles  She has not started any of these medications  Patient also admits that she has not been taking her blood pressure medication this week because she has forgotten  The following portions of the patient's history were reviewed and updated as appropriate:   She has a past medical history of Cancer ([facility] [address] ) and Hypertension  ,  does not have any pertinent problems on file  ,   has a past surgical history that includes Breast surgery; [place] lymph node biopsy; Breast lumpectomy;  section; and Anterior cruciate ligament repair  ,  family history includes Diabetes in her paternal grandmother; Hypertension in her father and mother; Melanoma in her mother; Other in her father; Pancreatic cancer in her father  ,   reports that she has been smoking  She has been smoking about 0 50 packs per day  She has never used smokeless tobacco  She reports current alcohol use  She reports that she does not use drugs  ,  is allergic to lisinopril     Current Outpatient Medications   Medication Sig Dispense Refill    azithromycin (ZITHROMAX) 250 mg tablet       benzonatate (TESSALON) 200 MG capsule Take 200 mg by mouth Three times daily as needed      fluticasone (FLONASE) 50 mcg/act nasal spray       losartan-hydrochlorothiazide (HYZAAR) 100-25 MG per tablet Take 1 tablet by mouth daily 30 tablet 2    methylPREDNISolone 4 MG tablet therapy pack Take as directed per package instructions   metoprolol tartrate (LOPRESSOR) 25 mg tablet Take 1 tablet (25 mg total) by mouth every 12 (twelve) hours 60 tablet 1     No current facility-administered medications for this visit  Review of Systems   Constitutional: Positive for fatigue  Negative for fever  HENT: Positive for congestion and postnasal drip  Negative for ear pain, sinus pressure, sinus pain, sore throat and trouble swallowing  Eyes: Negative for pain  Respiratory: Positive for cough  Negative for shortness of breath  Cardiovascular: Negative for chest pain, palpitations and leg swelling  Gastrointestinal: Negative for abdominal pain  Neurological: Positive for headaches  Negative for dizziness and light-headedness  Objective:  Vitals:    04/05/22 1027   BP: (!) 202/120   BP Location: Left arm   Patient Position: Sitting   Cuff Size: Adult   Pulse: 94   Temp: 99 °F (37 2 °C)   TempSrc: Tympanic   SpO2: 98%   Weight: 72 6 kg (160 lb)   Height: 5' 5" (1 651 m)     Body mass index is 26 63 kg/m²  Physical Exam  Constitutional:       Appearance: She is ill-appearing  HENT:      Head: Normocephalic and atraumatic  Right Ear: Tympanic membrane, ear canal and external ear normal       Left Ear: Tympanic membrane, ear canal and external ear normal       Nose: Congestion present  Mouth/Throat:      Mouth: Mucous membranes are moist       Pharynx: Oropharynx is clear  Eyes:      Conjunctiva/sclera: Conjunctivae normal    Cardiovascular:      Rate and Rhythm: Normal rate and regular rhythm  Pulses: Normal pulses  Heart sounds: Normal heart sounds     Pulmonary:      Effort: Pulmonary effort is normal       Breath sounds: Normal breath sounds  Lymphadenopathy:      Cervical: No cervical adenopathy  Neurological:      Mental Status: She is alert and oriented to person, place, and time     Psychiatric:         Mood and Affect: Mood normal          Behavior: Behavior normal

## 2022-04-15 ENCOUNTER — OFFICE VISIT (OUTPATIENT)
Dept: FAMILY MEDICINE CLINIC | Facility: CLINIC | Age: 47
End: 2022-04-15
Payer: COMMERCIAL

## 2022-04-15 VITALS
OXYGEN SATURATION: 98 % | HEART RATE: 83 BPM | BODY MASS INDEX: 26.66 KG/M2 | WEIGHT: 160 LBS | DIASTOLIC BLOOD PRESSURE: 100 MMHG | SYSTOLIC BLOOD PRESSURE: 178 MMHG | HEIGHT: 65 IN | TEMPERATURE: 98 F

## 2022-04-15 DIAGNOSIS — I10 ESSENTIAL HYPERTENSION: Primary | ICD-10-CM

## 2022-04-15 PROCEDURE — 99213 OFFICE O/P EST LOW 20 MIN: CPT | Performed by: PHYSICIAN ASSISTANT

## 2022-04-15 PROCEDURE — 4004F PT TOBACCO SCREEN RCVD TLK: CPT | Performed by: PHYSICIAN ASSISTANT

## 2022-04-15 PROCEDURE — 3725F SCREEN DEPRESSION PERFORMED: CPT | Performed by: PHYSICIAN ASSISTANT

## 2022-04-15 PROCEDURE — 3008F BODY MASS INDEX DOCD: CPT | Performed by: PHYSICIAN ASSISTANT

## 2022-04-15 RX ORDER — LOSARTAN POTASSIUM AND HYDROCHLOROTHIAZIDE 25; 100 MG/1; MG/1
1 TABLET ORAL DAILY
Qty: 30 TABLET | Refills: 2 | Status: SHIPPED | OUTPATIENT
Start: 2022-04-15

## 2022-04-15 NOTE — PROGRESS NOTES
Assessment/Plan:          Diagnoses and all orders for this visit:    Essential hypertension  -     losartan-hydrochlorothiazide (HYZAAR) 100-25 MG per tablet; Take 1 tablet by mouth daily        Patient is to start metoprolol as prescribed  She is to monitor blood pressure at home and follow-up in our office in 4 weeks  Subjective:      Patient ID: Jeffrey Augustin is a 48 y o  female  Patient is here for follow-up of her blood pressure  She restarted her losartan hydrochlorothiazide  She did not start the metoprolol  She felt that she would just start 1 and wait to see if she knew the metoprolol  She took her blood pressure at home and has noticed it has decreased a little bit  She denies any headache, shortness of breath or chest pain  The following portions of the patient's history were reviewed and updated as appropriate:   She has a past medical history of Cancer ([facility] [address] ) and Hypertension  ,  does not have any pertinent problems on file  ,   has a past surgical history that includes Breast surgery; [place] lymph node biopsy; Breast lumpectomy;  section; and Anterior cruciate ligament repair  ,  family history includes Diabetes in her paternal grandmother; Hypertension in her father and mother; Melanoma in her mother; Other in her father; Pancreatic cancer in her father  ,   reports that she has been smoking  She has been smoking about 0 50 packs per day  She has never used smokeless tobacco  She reports current alcohol use  She reports that she does not use drugs  ,  is allergic to lisinopril     Current Outpatient Medications   Medication Sig Dispense Refill    losartan-hydrochlorothiazide (HYZAAR) 100-25 MG per tablet Take 1 tablet by mouth daily 30 tablet 2    metoprolol tartrate (LOPRESSOR) 25 mg tablet Take 1 tablet (25 mg total) by mouth every 12 (twelve) hours (Patient not taking: Reported on 4/15/2022 ) 60 tablet 1     No current facility-administered medications for this visit  Review of Systems   Constitutional: Negative for fatigue  Respiratory: Negative for chest tightness and shortness of breath  Cardiovascular: Negative for chest pain, palpitations and leg swelling  Gastrointestinal: Negative for abdominal pain  Neurological: Negative for dizziness and headaches  Objective:  Vitals:    04/15/22 0829   BP: (!) 178/100   BP Location: Left arm   Patient Position: Sitting   Cuff Size: Adult   Pulse: 83   Temp: 98 °F (36 7 °C)   TempSrc: Tympanic   SpO2: 98%   Weight: 72 6 kg (160 lb)   Height: 5' 5" (1 651 m)     Body mass index is 26 63 kg/m²  Physical Exam  Constitutional:       Appearance: Normal appearance  HENT:      Head: Normocephalic and atraumatic  Eyes:      Conjunctiva/sclera: Conjunctivae normal    Cardiovascular:      Rate and Rhythm: Normal rate and regular rhythm  Heart sounds: Normal heart sounds  Pulmonary:      Effort: Pulmonary effort is normal       Breath sounds: Normal breath sounds  Neurological:      Mental Status: She is alert and oriented to person, place, and time     Psychiatric:         Mood and Affect: Mood normal          Behavior: Behavior normal

## 2022-07-18 ENCOUNTER — TELEMEDICINE (OUTPATIENT)
Dept: FAMILY MEDICINE CLINIC | Facility: CLINIC | Age: 47
End: 2022-07-18
Payer: COMMERCIAL

## 2022-07-18 VITALS — WEIGHT: 153 LBS | BODY MASS INDEX: 25.49 KG/M2 | HEIGHT: 65 IN

## 2022-07-18 DIAGNOSIS — U07.1 COVID-19: Primary | ICD-10-CM

## 2022-07-18 PROCEDURE — 99214 OFFICE O/P EST MOD 30 MIN: CPT | Performed by: FAMILY MEDICINE

## 2022-07-18 PROCEDURE — 3725F SCREEN DEPRESSION PERFORMED: CPT | Performed by: FAMILY MEDICINE

## 2022-07-18 NOTE — PROGRESS NOTES
COVID-19 Outpatient Progress Note    Assessment/Plan:    Problem List Items Addressed This Visit    None     Visit Diagnoses     COVID-19    -  Primary    Relevant Medications    nirmatrelvir & ritonavir (Paxlovid) tablet therapy pack         Disposition:     Patient has COVID-19 infection  Based off CDC guidelines, they were recommended to isolate for 5 days from the date of the positive test  If they remain asymptomatic, isolation may be ended followed by 5 days of wearing a mask when around othes to minimize risk of infecting others  If they have a fever, continue to stay home until fever resolves for at least 24 hours  Discussed symptom directed medication options with patient  Discussed vitamin D, vitamin C, and/or zinc supplementation with patient  Patient meets criteria for PAXLOVID and they have been counseled appropriately according to EUA documentation released by the FDA  After discussion, patient agrees to treatment  Selin Nino is an investigational medicine used to treat mild-to-moderate COVID-19 in adults and children (15years of age and older weighing at least 80 pounds (40 kg)) with positive results of direct SARS-CoV-2 viral testing, and who are at high risk for progression to severe COVID-19, including hospitalization or death  PAXLOVID is investigational because it is still being studied  There is limited information about the safety and effectiveness of using PAXLOVID to treat people with mild-to-moderate COVID-19  The FDA has authorized the emergency use of PAXLOVID for the treatment of mild-tomoderate COVID-19 in adults and children (15years of age and older weighing at least 80 pounds (40 kg)) with a positive test for the virus that causes COVID-19, and who are at high risk for progression to severe COVID-19, including hospitalization or death, under an EUA  What should I tell my healthcare provider before I take PAXLOVID?     Tell your healthcare provider if you:  - Have any allergies  - Have liver or kidney disease  - Are pregnant or plan to become pregnant  - Are breastfeeding a child  - Have any serious illnesses    Tell your healthcare provider about all the medicines you take, including prescription and over-the-counter medicines, vitamins, and herbal supplements  Some medicines may interact with PAXLOVID and may cause serious side effects  Keep a list of your medicines to show your healthcare provider and pharmacist when you get a new medicine  You can ask your healthcare provider or pharmacist for a list of medicines that interact with PAXLOVID  Do not start taking a new medicine without telling your healthcare provider  Your healthcare provider can tell you if it is safe to take PAXLOVID with other medicines  Tell your healthcare provider if you are taking combined hormonal contraceptive  PAXLOVID may affect how your birth control pills work  Females who are able to become pregnant should use another effective alternative form of contraception or an additional barrier method of contraception  Talk to your healthcare provider if you have any questions about contraceptive methods that might be right for you  How do I take PAXLOVID? PAXLOVID consists of 2 medicines: nirmatrelvir and ritonavir  - Take 2 pink tablets of nirmatrelvir with 1 white tablet of ritonavir by mouth 2 times each day (in the morning and in the evening) for 5 days  For each dose, take all 3 tablets at the same time  - If you have kidney disease, talk to your healthcare provider  You may need a different dose  - Swallow the tablets whole  Do not chew, break, or crush the tablets  - Take PAXLOVID with or without food  - Do not stop taking PAXLOVID without talking to your healthcare provider, even if you feel better  - If you miss a dose of PAXLOVID within 8 hours of the time it is usually taken, take it as soon as you remember   If you miss a dose by more than 8 hours, skip the missed dose and take the next dose at your regular time  Do not take 2 doses of PAXLOVID at the same time  - If you take too much PAXLOVID, call your healthcare provider or go to the nearest hospital emergency room right away  - If you are taking a ritonavir- or cobicistat-containing medicine to treat hepatitis C or Human Immunodeficiency Virus (HIV), you should continue to take your medicine as prescribed by your healthcare provider   - Talk to your healthcare provider if you do not feel better or if you feel worse after 5 days  Who should generally not take PAXLOVID? Do not take PAXLOVID if:  You are allergic to nirmatrelvir, ritonavir, or any of the ingredients in PAXLOVID  You are taking any of the following medicines:  - Alfuzosin  - Pethidine, piroxicam, propoxyphene  - Ranolazine  - Amiodarone, dronedarone, flecainide, propafenone, quinidine  - Colchicine  - Lurasidone, pimozide, clozapine  - Dihydroergotamine, ergotamine, methylergonovine  - Lovastatin, simvastatin  - Sildenafil (Revatio®) for pulmonary arterial hypertension (PAH)  - Triazolam, oral midazolam  - Apalutamide  - Carbamazepine, phenobarbital, phenytoin  - Rifampin  - St  Dengs Wort (hypericum perforatum)    What are the important possible side effects of PAXLOVID? Possible side effects of PAXLOVID are:  - Liver Problems  Tell your healthcare provider right away if you have any of these signs and symptoms of liver problems: loss of appetite, yellowing of your skin and the whites of eyes (jaundice), dark-colored urine, pale colored stools and itchy skin, stomach area (abdominal) pain  - Resistance to HIV Medicines  If you have untreated HIV infection, PAXLOVID may lead to some HIV medicines not working as well in the future  - Other possible side effects include: altered sense of taste, diarrhea, high blood pressure, or muscle aches    These are not all the possible side effects of PAXLOVID  Not many people have taken PAXLOVID   Serious and unexpected side effects may happen  Audrea Siddiqui is still being studied, so it is possible that all of the risks are not known at this time  What other treatment choices are there? Like Meme Melchor may allow for the emergency use of other medicines to treat people with COVID-19  Go to https://[employer]/ for information on the emergency use of other medicines that are authorized by FDA to treat people with COVID-19  Your healthcare provider may talk with you about clinical trials for which you may be eligible  It is your choice to be treated or not to be treated with PAXLOVID  Should you decide not to receive it or for your child not to receive it, it will not change your standard medical care  What if I am pregnant or breastfeeding? There is no experience treating pregnant women or breastfeeding mothers with PAXLOVID  For a mother and unborn baby, the benefit of taking PAXLOVID may be greater than the risk from the treatment  If you are pregnant, discuss your options and specific situation with your healthcare provider  It is recommended that you use effective barrier contraception or do not have sexual activity while taking PAXLOVID  If you are breastfeeding, discuss your options and specific situation with your healthcare provider  How do I report side effects with PAXLOVID? Contact your healthcare provider if you have any side effects that bother you or do not go away  Report side effects to FDA MedWatch at www fda gov/medwatch or call 1-137-EPY1921 or you can report side effects to [facility]  at the contact information provided below  Website Fax number Telephone number   [employer] [phone]     How should I store Audrea Siddiqui? Store PAXLOVID tablets at room temperature between 68°F to 77°F (20°C to 25°C)      Full fact sheet for patients, parents, and caregivers can be found at: [employer]    I have spent 10 minutes directly with the patient  Greater than 50% of this time was spent in counseling/coordination of care regarding: risks and benefits of treatment options and instructions for management  Encounter provider Napoleon Malcolm DO    Provider located at [facility] [address]  [address] [address] [address]  [phone]    Recent Visits  No visits were found meeting these conditions  Showing recent visits within past 7 days and meeting all other requirements  Today's Visits  Date Type Provider Dept   07/18/22 Telemedicine Napoleon Malcolm DO [facility] [address] [facility]   Showing today's visits and meeting all other requirements  Future Appointments  No visits were found meeting these conditions  Showing future appointments within next 150 days and meeting all other requirements     This virtual check-in was done via [employer] [address] and patient was informed that this is a secure, HIPAA-compliant platform  She agrees to proceed  Patient agrees to participate in a virtual check in via telephone or video visit instead of presenting to the office to address urgent/immediate medical needs  Patient is aware this is a billable service  After connecting through [facility], the patient was identified by name and date of birth  Aniya Howard was informed that this was a telemedicine visit and that the exam was being conducted confidentially over secure lines  My office door was closed  No one else was in the room  Aniya Howard acknowledged consent and understanding of privacy and security of the telemedicine visit  I informed the patient that I have reviewed her record in Epic and presented the opportunity for her to ask any questions regarding the visit today  The patient agreed to participate      Verification of patient location:  Patient is located in the following state in which I hold an active license: [place]    Subjective:   Monique Fuentes is a 48 y o  female who has been screened for COVID-19  Symptom change since last report: improving  Patient's symptoms include fever (102 9 resolved), fatigue, diarrhea, myalgias and headache  Patient denies chills, malaise, congestion, rhinorrhea, sore throat, anosmia, loss of taste, cough, shortness of breath, chest tightness, abdominal pain, nausea and vomiting      - Date of symptom onset: 2022  - Date of positive COVID-19 test: 2022  Type of test: Home antigen  Patient with typical symptoms of COVID-19 and they attest that they were positive on home rapid antigen testing  Image of positive result is not able to be uploaded into their chart  COVID-19 vaccination status: Not vaccinated    Dayron Melton has been staying home and has isolated themselves in her home  She is taking care to not share personal items and is cleaning all surfaces that are touched often, like counters, tabletops, and doorknobs using household cleaning sprays or wipes  She is wearing a mask when she leaves her room       Lab Results   Component Value Date    SARSCOV2 Negative 2021     Past Medical History:   Diagnosis Date    Cancer [facility])     breast    Hypertension      Past Surgical History:   Procedure Laterality Date    ANTERIOR CRUCIATE LIGAMENT REPAIR      BREAST LUMPECTOMY      BREAST SURGERY       SECTION      SENTINEL LYMPH NODE BIOPSY       Current Outpatient Medications   Medication Sig Dispense Refill    losartan-hydrochlorothiazide (HYZAAR) 100-25 MG per tablet Take 1 tablet by mouth daily 30 tablet 2    metoprolol tartrate (LOPRESSOR) 25 mg tablet Take 1 tablet (25 mg total) by mouth every 12 (twelve) hours 60 tablet 1    nirmatrelvir & ritonavir (Paxlovid) tablet therapy pack Take 3 tablets by mouth 2 (two) times a day for 5 days Take 2 nirmatrelvir tablets + 1 ritonavir tablet together per dose 30 tablet 0     No current facility-administered medications for this visit  Allergies   Allergen Reactions    Lisinopril Cough     Cough on ace       Review of Systems   Constitutional: Positive for fatigue and fever (102 9 resolved)  Negative for chills  HENT: Negative for congestion, rhinorrhea and sore throat  Respiratory: Negative for cough, chest tightness and shortness of breath  Gastrointestinal: Positive for diarrhea  Negative for abdominal pain, nausea and vomiting  Musculoskeletal: Positive for myalgias  Neurological: Positive for headaches  Objective:    Vitals:    07/18/22 1041   Weight: 69 4 kg (153 lb)   Height: 5' 5" (1 651 m)       Physical Exam    VIRTUAL VISIT DISCLAIMER    Michael Leo verbally agrees to participate in [employer]  Pt is aware that [employer] could be limited without vital signs or the ability to perform a full hands-on physical Buck Hoang understands she or the provider may request at any time to terminate the video visit and request the patient to seek care or treatment in person

## 2023-03-12 ENCOUNTER — HOSPITAL ENCOUNTER (EMERGENCY)
Facility: HOSPITAL | Age: 48
Discharge: HOME | End: 2023-03-12
Attending: EMERGENCY MEDICINE | Admitting: EMERGENCY MEDICINE

## 2023-03-12 ENCOUNTER — APPOINTMENT (EMERGENCY)
Dept: CT IMAGING | Facility: HOSPITAL | Age: 48
End: 2023-03-12

## 2023-03-12 VITALS
RESPIRATION RATE: 18 BRPM | DIASTOLIC BLOOD PRESSURE: 100 MMHG | TEMPERATURE: 98.1 F | SYSTOLIC BLOOD PRESSURE: 222 MMHG | HEART RATE: 84 BPM | OXYGEN SATURATION: 97 %

## 2023-03-12 DIAGNOSIS — R10.9 NONSPECIFIC ABDOMINAL PAIN: Primary | ICD-10-CM

## 2023-03-12 LAB
ALBUMIN SERPL BCP-MCNC: 4 G/DL (ref 3.5–5)
ALP SERPL-CCNC: 166 U/L (ref 34–104)
ALT SERPL W P-5'-P-CCNC: 13 U/L (ref 7–52)
ANION GAP SERPL CALCULATED.3IONS-SCNC: 8 MMOL/L (ref 4–13)
AST SERPL W P-5'-P-CCNC: 14 U/L (ref 13–39)
B-HCG SERPL-ACNC: 7 MIU/ML (ref 0–11.6)
BASOPHILS # BLD AUTO: 0.1 THOUSANDS/ÂΜL (ref 0–0.1)
BASOPHILS NFR BLD AUTO: 1 % (ref 0–1)
BILIRUB DIRECT SERPL-MCNC: 0.09 MG/DL (ref 0–0.2)
BILIRUB SERPL-MCNC: 0.57 MG/DL (ref 0.2–1)
BUN SERPL-MCNC: 10 MG/DL (ref 5–25)
CALCIUM SERPL-MCNC: 9.8 MG/DL (ref 8.4–10.2)
CHLORIDE SERPL-SCNC: 104 MMOL/L (ref 96–108)
CO2 SERPL-SCNC: 25 MMOL/L (ref 21–32)
CREAT SERPL-MCNC: 0.67 MG/DL (ref 0.6–1.3)
EOSINOPHIL # BLD AUTO: 0.05 THOUSAND/ÂΜL (ref 0–0.61)
EOSINOPHIL NFR BLD AUTO: 1 % (ref 0–6)
ERYTHROCYTE [DISTWIDTH] IN BLOOD BY AUTOMATED COUNT: 13.2 % (ref 11.6–15.1)
GFR SERPL CREATININE-BSD FRML MDRD: 103 ML/MIN/1.73SQ M
GLUCOSE SERPL-MCNC: 92 MG/DL (ref 65–140)
HCG SERPL QL: ABNORMAL
HCT VFR BLD AUTO: 48.1 % (ref 34.8–46.1)
HGB BLD-MCNC: 16 G/DL (ref 11.5–15.4)
IMM GRANULOCYTES # BLD AUTO: 0.02 THOUSAND/UL (ref 0–0.2)
IMM GRANULOCYTES NFR BLD AUTO: 0 % (ref 0–2)
LYMPHOCYTES # BLD AUTO: 3.56 THOUSANDS/ÂΜL (ref 0.6–4.47)
LYMPHOCYTES NFR BLD AUTO: 37 % (ref 14–44)
MCH RBC QN AUTO: 30.8 PG (ref 26.8–34.3)
MCHC RBC AUTO-ENTMCNC: 33.3 G/DL (ref 31.4–37.4)
MCV RBC AUTO: 93 FL (ref 82–98)
MONOCYTES # BLD AUTO: 0.63 THOUSAND/ÂΜL (ref 0.17–1.22)
MONOCYTES NFR BLD AUTO: 7 % (ref 4–12)
NEUTROPHILS # BLD AUTO: 5.38 THOUSANDS/ÂΜL (ref 1.85–7.62)
NEUTS SEG NFR BLD AUTO: 54 % (ref 43–75)
NRBC BLD AUTO-RTO: 0 /100 WBCS
PLATELET # BLD AUTO: 346 THOUSANDS/UL (ref 149–390)
PMV BLD AUTO: 9 FL (ref 8.9–12.7)
POTASSIUM SERPL-SCNC: 4 MMOL/L (ref 3.5–5.3)
PROT SERPL-MCNC: 8.6 G/DL (ref 6.4–8.4)
RBC # BLD AUTO: 5.19 MILLION/UL (ref 3.81–5.12)
SODIUM SERPL-SCNC: 137 MMOL/L (ref 135–147)
WBC # BLD AUTO: 9.74 THOUSAND/UL (ref 4.31–10.16)

## 2023-03-12 RX ORDER — OXYCODONE HYDROCHLORIDE AND ACETAMINOPHEN 5; 325 MG/1; MG/1
1 TABLET ORAL EVERY 6 HOURS PRN
Qty: 20 TABLET | Refills: 0 | Status: SHIPPED | OUTPATIENT
Start: 2023-03-12

## 2023-03-12 RX ADMIN — IOHEXOL 100 ML: 350 INJECTION, SOLUTION INTRAVENOUS at 15:17

## 2023-03-12 NOTE — Clinical Note
Jake Weiss was seen and treated in our emergency department on 3/12/2023  Diagnosis:     Phuc Pinto  may return to work on return date  She may return on this date: 03/15/2023    When patient returns to work, reduced lifting and strenuous activity     If you have any questions or concerns, please don't hesitate to call        Chastity Metzger MD    ______________________________           _______________          _______________  Hospital Representative                              Date                                Time

## 2023-03-12 NOTE — DISCHARGE INSTRUCTIONS
Rest  Motrin if needed for pain  Percocet 1 every 6 hours if needed for more severe pain  Follow-up with your doctor and provider and gynecology  Return increasing pain worsening symptoms fever or any problems

## 2023-03-12 NOTE — ED PROVIDER NOTES
History  Chief Complaint   Patient presents with   • Pelvic Pain     Pelvic pain and pressure x 4 days  Denies vaginal bleeding      HPI  Patient is a 47-year-old female, presents emergency department with 4 days of lower abdominal pain presents with primarily left lower quadrant abdominal pain which she describes as a pressure sensation  She denies any vomiting or diarrhea  She denies any rash  Denies any trauma to the area  Patient reports that she was concerned because the pain was persistent and seems to be increasing over the last few days  She denies any vaginal bleeding  Patient denies any intercourse over the last several years does not believe she could be pregnant  She denies any urinary symptoms  Patient is significantly hypertensive on arrival, discussed with her she reports she took her blood pressure medicine she is always elevated and does not want me to add additional medications  Patient understands the risk  Past no history of breast cancer hypertension  Family history noncontributory  Social history: Smoker, denies drug abuse  Prior to Admission Medications   Prescriptions Last Dose Informant Patient Reported?  Taking?   losartan-hydrochlorothiazide (HYZAAR) 100-25 MG per tablet   No No   Sig: Take 1 tablet by mouth daily   metoprolol tartrate (LOPRESSOR) 25 mg tablet   No No   Sig: Take 1 tablet (25 mg total) by mouth every 12 (twelve) hours      Facility-Administered Medications: None       Past Medical History:   Diagnosis Date   • Cancer ([facility] [address] )     breast   • Hypertension        Past Surgical History:   Procedure Laterality Date   • ANTERIOR CRUCIATE LIGAMENT REPAIR     • BREAST LUMPECTOMY     • BREAST SURGERY     •  SECTION     • SENTINEL LYMPH NODE BIOPSY         Family History   Problem Relation Age of Onset   • Hypertension Mother    • Melanoma Mother    • Other Father         Brain Tumor   • Hypertension Father    • Pancreatic cancer Father    • Diabetes Paternal Grandmother      I have reviewed and agree with the history as documented  E-Cigarette/Vaping   • E-Cigarette Use Never User      E-Cigarette/Vaping Substances   • Nicotine No    • THC No    • CBD No    • Flavoring No    • Other No    • Unknown No      Social History     Tobacco Use   • Smoking status: Every Day     Packs/day: 0 50     Types: Cigarettes   • Smokeless tobacco: Never   Vaping Use   • Vaping Use: Never used   Substance Use Topics   • Alcohol use: Yes   • Drug use: No       Review of Systems   Constitutional: Negative for diaphoresis, fatigue and fever  HENT: Negative for congestion, ear pain, nosebleeds and sore throat  Eyes: Negative for photophobia, pain, discharge and visual disturbance  Respiratory: Negative for cough, choking, chest tightness, shortness of breath and wheezing  Cardiovascular: Negative for chest pain and palpitations  Gastrointestinal: Positive for abdominal pain  Negative for abdominal distention, diarrhea and vomiting  Genitourinary: Negative for dysuria, flank pain and frequency  Musculoskeletal: Negative for back pain, gait problem and joint swelling  Skin: Negative for color change and rash  Neurological: Negative for dizziness, syncope and headaches  Psychiatric/Behavioral: Negative for behavioral problems and confusion  The patient is not nervous/anxious  All other systems reviewed and are negative  Physical Exam  Physical Exam  Vitals and nursing note reviewed  Constitutional:       Appearance: She is well-developed  HENT:      Head: Normocephalic  Right Ear: External ear normal       Left Ear: External ear normal       Nose: Nose normal    Eyes:      General: Lids are normal       Pupils: Pupils are equal, round, and reactive to light  Cardiovascular:      Rate and Rhythm: Normal rate and regular rhythm  Pulses: Normal pulses  Heart sounds: Normal heart sounds     Pulmonary:      Effort: Pulmonary effort is normal  No respiratory distress  Breath sounds: Normal breath sounds  Abdominal:      General: Abdomen is flat  Bowel sounds are normal       Tenderness: There is abdominal tenderness  Comments: There is left lower quadrant tenderness without rebound or guarding   Musculoskeletal:         General: No deformity  Normal range of motion  Cervical back: Normal range of motion and neck supple  Skin:     General: Skin is warm and dry  Neurological:      Mental Status: She is alert and oriented to person, place, and time           Vital Signs  ED Triage Vitals [03/12/23 1342]   Temperature Pulse Respirations Blood Pressure SpO2   98 1 °F (36 7 °C) 88 18 (!) 207/123 97 %      Temp Source Heart Rate Source Patient Position - Orthostatic VS BP Location FiO2 (%)   Oral Monitor Sitting Left arm --      Pain Score       --           Vitals:    03/12/23 1530 03/12/23 1600 03/12/23 1630 03/12/23 1700   BP: (!) 240/113 (!) 194/94 (!) 184/104 (!) 222/100   Pulse: 80 81 83 84   Patient Position - Orthostatic VS:             Visual Acuity      ED Medications  Medications   iohexol (OMNIPAQUE) 350 MG/ML injection (MULTI-DOSE) 100 mL (100 mL Intravenous Given 3/12/23 1517)       Diagnostic Studies  Results Reviewed     Procedure Component Value Units Date/Time    hCG, quantitative [[id number]]  (Normal) Collected: 03/12/23 1406    Lab Status: Final result Specimen: Blood from Arm, Left Updated: 03/12/23 1532     HCG, Quant 7 mIU/mL     Narrative:       Expected Ranges:     Approximate               Approximate HCG  Gestation age          Concentration ( mIU/mL)  _____________          ______________________   Tawanda Cruz                      HCG values  0 2-1                       5-50  1-2                           2-3                         100-5000  3-4                         500-82864  4-5                         1000-18064  5-6                         [id number]  6-8                         [id number]  8-12 06420-385039      hCG, qualitative pregnancy [[id number]]  (Abnormal) Collected: 03/12/23 1406    Lab Status: Final result Specimen: Blood from Arm, Left Updated: 03/12/23 1448     Preg, Serum Borderline    Basic metabolic panel [[id number]] Collected: 03/12/23 1406    Lab Status: Final result Specimen: Blood from Arm, Left Updated: 03/12/23 1438     Sodium 137 mmol/L      Potassium 4 0 mmol/L      Chloride 104 mmol/L      CO2 25 mmol/L      ANION GAP 8 mmol/L      BUN 10 mg/dL      Creatinine 0 67 mg/dL      Glucose 92 mg/dL      Calcium 9 8 mg/dL      eGFR 103 ml/min/1 73sq m     Narrative:      [place] guidelines for Chronic Kidney Disease (CKD):   •  Stage 1 with normal or high GFR (GFR > 90 mL/min/1 73 square meters)  •  Stage 2 Mild CKD (GFR = 60-89 mL/min/1 73 square meters)  •  Stage 3A Moderate CKD (GFR = 45-59 mL/min/1 73 square meters)  •  Stage 3B Moderate CKD (GFR = 30-44 mL/min/1 73 square meters)  •  Stage 4 Severe CKD (GFR = 15-29 mL/min/1 73 square meters)  •  Stage 5 End Stage CKD (GFR <15 mL/min/1 73 square meters)  Note: GFR calculation is accurate only with a steady state creatinine    Hepatic function panel [[id number]]  (Abnormal) Collected: 03/12/23 1406    Lab Status: Final result Specimen: Blood from Arm, Left Updated: 03/12/23 1438     Total Bilirubin 0 57 mg/dL      Bilirubin, Direct 0 09 mg/dL      Alkaline Phosphatase 166 U/L      AST 14 U/L      ALT 13 U/L      Total Protein 8 6 g/dL      Albumin 4 0 g/dL     CBC and differential [[id number]]  (Abnormal) Collected: 03/12/23 1406    Lab Status: Final result Specimen: Blood from Arm, Left Updated: 03/12/23 1416     WBC 9 74 Thousand/uL      RBC 5 19 Million/uL      Hemoglobin 16 0 g/dL      Hematocrit 48 1 %      MCV 93 fL      MCH 30 8 pg      MCHC 33 3 g/dL      RDW 13 2 %      MPV 9 0 fL      Platelets 129 Thousands/uL      nRBC 0 /100 WBCs      Neutrophils Relative 54 %      Immat GRANS % 0 % Lymphocytes Relative 37 %      Monocytes Relative 7 %      Eosinophils Relative 1 %      Basophils Relative 1 %      Neutrophils Absolute 5 38 Thousands/µL      Immature Grans Absolute 0 02 Thousand/uL      Lymphocytes Absolute 3 56 Thousands/µL      Monocytes Absolute 0 63 Thousand/µL      Eosinophils Absolute 0 05 Thousand/µL      Basophils Absolute 0 10 Thousands/µL     UA w Reflex to Microscopic w Reflex to Culture [[id number]]     Lab Status: No result Specimen: Urine                  CT abdomen pelvis with contrast   Final Result by Pradeep Martin MD (03/12 1703)      No evidence of acute intra-abdominal or pelvic pathology  Aneurysmal dilatation of infrarenal abdominal aorta up to 4 cm     Nonemergent vascular surgery consult recommended  Workstation performed: [id number]                    Procedures  Procedures         ED Course       hCG from her serum reported as borderline  Discussed with the patient she reports she is not possible that she could be pregnant  No intercourse  Discussed with patient, could possibly be an hCG secreting lesion or some women have a low level of hCG in their system  Discussed with her the risk of doing the CAT scan if she is pregnant but the patient reports she cannot be pregnant  I discussed with the patient she consented to a CT scan despite the fact that her hCG was borderline positive  I will do a quantitative hCG to determine the level of hormone present  Discussed with the CT tech  Other diagnostic testing showed normal electrolytes, normal liver functions,  White count was normal at 9 7 no sign of inflammation hemoglobin was actually increased at 16  CT scan showed no acute pathology CT was done to rule out diverticulitis appendicitis, other etiologies            SBIRT 22yo+    Flowsheet Row Most Recent Value   SBIRT (25 yo +)    In order to provide better care to our patients, we are screening all of our patients for alcohol and drug use  Would it be okay to ask you these screening questions? Yes Filed at: 03/12/2023 1408   Initial Alcohol Screen: US AUDIT-C     1  How often do you have a drink containing alcohol? 0 Filed at: 03/12/2023 1408   2  How many drinks containing alcohol do you have on a typical day you are drinking? 0 Filed at: 03/12/2023 1408   3b  FEMALE Any Age, or MALE 65+: How often do you have 4 or more drinks on one occassion? 0 Filed at: 03/12/2023 1408   Audit-C Score 0 Filed at: 03/12/2023 1408   MAURICE: How many times in the past year have you    Used an illegal drug or used a prescription medication for non-medical reasons? Never Filed at: 03/12/2023 1408                    Medical Decision Making  Amount and/or Complexity of Data Reviewed  Labs: ordered  Radiology: ordered  Risk  Prescription drug management  Medical decision making 61-year-old female presents emergency department with lower abdominal pain, primarily left lower quadrant, CT to rule out diverticulitis another pelvic pathology was negative  Patient did have hypertension we discussed it she reports she does take her medicine and she does not feel she needed additional treatment for her blood pressure  We discussed the risk benefit  Patient chose not to start any additional medication  We discussed her abdominal pain and the fact that she had significant tenderness but a normal CT scan  We discussed follow-up with her provider for further diagnostic testing we discussed GYN follow-up as The patient has a positive hCG and lower abdominal pain I was concerned about the source of the abnormal hCG  We discussed outpatient treatment and follow-up we discussed indications to return  No indication for admission or inpatient diagnostic testing at this time      Disposition  Final diagnoses:   Nonspecific abdominal pain     Time reflects when diagnosis was documented in both MDM as applicable and the Disposition within this note     Time User Action Codes Description Comment    3/12/2023  5:10 PM Virgil Rivas Add [R10 9] Nonspecific abdominal pain       ED Disposition     ED Disposition   Discharge    Condition   Stable    Date/Time   Sun Mar 12, 2023  5:10 PM    Comment   Kelly Nicole discharge to home/self care  Follow-up Information     Follow up With Specialties Details Why Contact Info    Rosendo Weeks MD Obstetrics and Gynecology, Obstetrics, Gynecology   [address] [address] [address] [place] [address] [address] [facility]  [phone]            Discharge Medication List as of 3/12/2023  5:13 PM      START taking these medications    Details   oxyCODONE-acetaminophen (PERCOCET) 5-325 mg per tablet Take 1 tablet by mouth every 6 (six) hours as needed for severe pain or moderate pain for up to 20 doses Max Daily Amount: 4 tablets, Starting Sun 3/12/2023, Normal         CONTINUE these medications which have NOT CHANGED    Details   losartan-hydrochlorothiazide (HYZAAR) 100-25 MG per tablet Take 1 tablet by mouth daily, Starting Fri 4/15/2022, Normal      metoprolol tartrate (LOPRESSOR) 25 mg tablet Take 1 tablet (25 mg total) by mouth every 12 (twelve) hours, Starting Tue 4/5/2022, Normal             No discharge procedures on file      PDMP Review     None          ED Provider  Electronically Signed by           Saeed Parsons MD  03/12/23 9893

## 2023-03-17 ENCOUNTER — OFFICE VISIT (OUTPATIENT)
Dept: OBGYN CLINIC | Facility: CLINIC | Age: 48
End: 2023-03-17

## 2023-03-17 VITALS
DIASTOLIC BLOOD PRESSURE: 100 MMHG | HEIGHT: 65 IN | BODY MASS INDEX: 24.89 KG/M2 | WEIGHT: 149.4 LBS | SYSTOLIC BLOOD PRESSURE: 214 MMHG

## 2023-03-17 DIAGNOSIS — R39.89 BLADDER PAIN: ICD-10-CM

## 2023-03-17 DIAGNOSIS — Z12.4 CERVICAL CANCER SCREENING: ICD-10-CM

## 2023-03-17 DIAGNOSIS — R10.2 PELVIC PAIN: Primary | ICD-10-CM

## 2023-03-17 DIAGNOSIS — I71.40 ABDOMINAL ANEURYSM (HCC): ICD-10-CM

## 2023-03-17 RX ORDER — NITROFURANTOIN 25; 75 MG/1; MG/1
100 CAPSULE ORAL 2 TIMES DAILY
Qty: 7 CAPSULE | Refills: 0 | Status: SHIPPED | OUTPATIENT
Start: 2023-03-17

## 2023-03-17 RX ORDER — PHENAZOPYRIDINE HYDROCHLORIDE 100 MG/1
100 TABLET, FILM COATED ORAL 3 TIMES DAILY PRN
Qty: 30 TABLET | Refills: 0 | Status: SHIPPED | OUTPATIENT
Start: 2023-03-17

## 2023-03-18 LAB
BACTERIA UR CULT: NORMAL
BACTERIA UR QL AUTO: ABNORMAL /HPF
BILIRUB UR QL STRIP: NEGATIVE
CLARITY UR: CLEAR
COLOR UR: ABNORMAL
GLUCOSE UR STRIP-MCNC: NEGATIVE MG/DL
HGB UR QL STRIP.AUTO: ABNORMAL
KETONES UR STRIP-MCNC: NEGATIVE MG/DL
LEUKOCYTE ESTERASE UR QL STRIP: NEGATIVE
NITRITE UR QL STRIP: NEGATIVE
NON-SQ EPI CELLS URNS QL MICRO: ABNORMAL /HPF
PH UR STRIP.AUTO: 6 [PH]
PROT UR STRIP-MCNC: NEGATIVE MG/DL
RBC #/AREA URNS AUTO: ABNORMAL /HPF
SP GR UR STRIP.AUTO: 1.01 (ref 1–1.03)
UROBILINOGEN UR STRIP-ACNC: <2 MG/DL
WBC #/AREA URNS AUTO: ABNORMAL /HPF

## 2023-03-20 LAB
HPV HR 12 DNA CVX QL NAA+PROBE: NEGATIVE
HPV16 DNA CVX QL NAA+PROBE: POSITIVE
HPV18 DNA CVX QL NAA+PROBE: NEGATIVE

## 2023-03-22 PROBLEM — J06.9 UPPER RESPIRATORY TRACT INFECTION: Status: RESOLVED | Noted: 2022-04-05 | Resolved: 2023-03-22

## 2023-03-22 PROBLEM — I71.40 ABDOMINAL ANEURYSM (HCC): Status: ACTIVE | Noted: 2023-03-22

## 2023-03-22 PROBLEM — R10.2 PELVIC PAIN: Status: ACTIVE | Noted: 2023-03-22

## 2023-03-22 NOTE — ASSESSMENT & PLAN NOTE
Noted on imaging during ER visit  Vascular consult placed     Aneurysmal dilatation of infrarenal abdominal aorta up to 4 cm

## 2023-03-22 NOTE — ASSESSMENT & PLAN NOTE
Onset 2 weeks ago  Unclear cause  Imaging does not indicate GYN origin  Pain is suprapubic can consider UTI, interstitial cystitis, PID, endometriosis less likely due to postmenopausal status  NO elevated WBC  No discharge on exam  No fever  Will order urine culture, bactrim and pyridium   If negative, no help with pyridium then recommend return to PCP for further evaluation  Consider GI, Urology, musculoskeletal etc      OF note, hypertension today in office recommend PCP eval this week       Abdominal aneurysm noted on imaging- vascular consult placed

## 2023-03-27 LAB
LAB AP GYN PRIMARY INTERPRETATION: NORMAL
Lab: NORMAL

## 2023-03-29 PROBLEM — B97.7 HIGH RISK HPV INFECTION: Status: ACTIVE | Noted: 2023-03-29

## 2023-04-20 ENCOUNTER — OFFICE VISIT (OUTPATIENT)
Dept: FAMILY MEDICINE CLINIC | Facility: CLINIC | Age: 48
End: 2023-04-20

## 2023-04-20 VITALS
HEIGHT: 65 IN | HEART RATE: 86 BPM | DIASTOLIC BLOOD PRESSURE: 116 MMHG | SYSTOLIC BLOOD PRESSURE: 200 MMHG | OXYGEN SATURATION: 99 % | BODY MASS INDEX: 24.66 KG/M2 | WEIGHT: 148 LBS | TEMPERATURE: 99.1 F

## 2023-04-20 DIAGNOSIS — I10 ESSENTIAL HYPERTENSION: Primary | ICD-10-CM

## 2023-04-20 DIAGNOSIS — R10.2 PELVIC PAIN: ICD-10-CM

## 2023-04-20 DIAGNOSIS — R31.29 MICROSCOPIC HEMATURIA: ICD-10-CM

## 2023-04-20 DIAGNOSIS — R10.32 BILATERAL GROIN PAIN: ICD-10-CM

## 2023-04-20 DIAGNOSIS — R10.31 BILATERAL GROIN PAIN: ICD-10-CM

## 2023-04-20 LAB
BACTERIA UR QL AUTO: ABNORMAL /HPF
BILIRUB UR QL STRIP: NEGATIVE
CLARITY UR: CLEAR
COLOR UR: ABNORMAL
GLUCOSE UR STRIP-MCNC: NEGATIVE MG/DL
HGB UR QL STRIP.AUTO: ABNORMAL
KETONES UR STRIP-MCNC: NEGATIVE MG/DL
LEUKOCYTE ESTERASE UR QL STRIP: NEGATIVE
NITRITE UR QL STRIP: NEGATIVE
NON-SQ EPI CELLS URNS QL MICRO: ABNORMAL /HPF
PH UR STRIP.AUTO: 6 [PH]
PROT UR STRIP-MCNC: NEGATIVE MG/DL
RBC #/AREA URNS AUTO: ABNORMAL /HPF
SL AMB  POCT GLUCOSE, UA: NEGATIVE
SL AMB LEUKOCYTE ESTERASE,UA: NEGATIVE
SL AMB POCT BILIRUBIN,UA: NEGATIVE
SL AMB POCT BLOOD,UA: ABNORMAL
SL AMB POCT CLARITY,UA: CLEAR
SL AMB POCT COLOR,UA: YELLOW
SL AMB POCT KETONES,UA: NEGATIVE
SL AMB POCT NITRITE,UA: NEGATIVE
SL AMB POCT PH,UA: 5
SL AMB POCT SPECIFIC GRAVITY,UA: 1.03
SL AMB POCT URINE PROTEIN: ABNORMAL
SL AMB POCT UROBILINOGEN: 0.2
SP GR UR STRIP.AUTO: 1.01 (ref 1–1.03)
UROBILINOGEN UR STRIP-ACNC: <2 MG/DL
WBC #/AREA URNS AUTO: ABNORMAL /HPF

## 2023-04-20 RX ORDER — LOSARTAN POTASSIUM AND HYDROCHLOROTHIAZIDE 25; 100 MG/1; MG/1
1 TABLET ORAL DAILY
Qty: 90 TABLET | Refills: 1 | Status: SHIPPED | OUTPATIENT
Start: 2023-04-20

## 2023-04-20 NOTE — PROGRESS NOTES
"Name: Milagros Bonilla      : 1975      MRN: [id number]  Encounter Provider: Madina Sainz DO  Encounter Date: 2023   Encounter department: [facility] [address] [facility]     1  Essential hypertension  -     losartan-hydrochlorothiazide (HYZAAR) 100-25 MG per tablet; Take 1 tablet by mouth daily  -     metoprolol tartrate (LOPRESSOR) 25 mg tablet; Take 1 tablet (25 mg total) by mouth every 12 (twelve) hours    2  Bilateral groin pain  -     XR hip/pelv 2-3 vws right if performed; Future; Expected date: 2023  -     XR hip/pelv 2-3 vws left if performed; Future; Expected date: 2023    3  Microscopic hematuria  -     Ambulatory Referral to Urology; Future    4  Pelvic pain  -     Ambulatory Referral to Urology; Future  -     Urinalysis with microscopic  -     POCT urine dip       Subjective      HPI     Patient presents to the office for follow up on pelvic pain  Patient states that she has not been taking her BP medications month ago  States that she has none  Notes that she needs refills  She has had pelvic pain for over 1 month  She has seen GYN and vascular, both have said that the pelvic pain is not of their etiology  Patient was treated for bladder infection with no resolution in symptoms  UA showing blood  Notes that she has no pain when sitting  States that this is located in both groins  States that walking feels like \"something is going to shatter\"  Has radiated down interior of thigh  Pain can be exacerbated by squatting or bending  Denies rash       Review of Systems    Current Outpatient Medications on File Prior to Visit   Medication Sig    nitrofurantoin (MACROBID) 100 mg capsule Take 1 capsule (100 mg total) by mouth 2 (two) times a day (Patient not taking: Reported on 2023)    oxyCODONE-acetaminophen (PERCOCET) 5-325 mg per tablet Take 1 tablet by mouth every 6 (six) hours as needed for severe pain or moderate " "pain for up to 20 doses Max Daily Amount: 4 tablets (Patient not taking: Reported on 4/20/2023)    phenazopyridine (PYRIDIUM) 100 mg tablet Take 1 tablet (100 mg total) by mouth 3 (three) times a day as needed for bladder spasms (Patient not taking: Reported on 4/20/2023)     Objective     BP (!) 200/116 (BP Location: Left arm, Patient Position: Sitting, Cuff Size: Adult)   Pulse 86   Temp 99 1 °F (37 3 °C)   Ht 5' 5\" (1 651 m)   Wt 67 1 kg (148 lb)   SpO2 99%   BMI 24 63 kg/m²     Physical Exam  Vitals reviewed  Constitutional:       General: She is not in acute distress  Appearance: Normal appearance  HENT:      Head: Normocephalic and atraumatic  Right Ear: External ear normal       Left Ear: External ear normal       Nose: Nose normal       Mouth/Throat:      Mouth: Mucous membranes are moist    Eyes:      Extraocular Movements: Extraocular movements intact  Conjunctiva/sclera: Conjunctivae normal    Cardiovascular:      Rate and Rhythm: Normal rate and regular rhythm  Heart sounds: Normal heart sounds  Pulmonary:      Effort: Pulmonary effort is normal       Breath sounds: Normal breath sounds  No wheezing, rhonchi or rales  Abdominal:      General: Bowel sounds are normal  There is no distension  Palpations: Abdomen is soft  Tenderness: There is no abdominal tenderness  Musculoskeletal:      Cervical back: Neck supple  Right lower leg: No edema  Left lower leg: No edema  Lymphadenopathy:      Cervical: No cervical adenopathy  Skin:     General: Skin is warm  Capillary Refill: Capillary refill takes less than 2 seconds  Findings: No rash  Neurological:      Mental Status: She is alert  Mental status is at baseline            Laurence Landau, DO "

## 2023-04-24 ENCOUNTER — TELEPHONE (OUTPATIENT)
Dept: UROLOGY | Facility: AMBULATORY SURGERY CENTER | Age: 48
End: 2023-04-24

## 2023-04-24 NOTE — TELEPHONE ENCOUNTER
New Patient    What is the reason for the patient’s appointment?: Pelvic pain w/ Microscopic hematuria  (Culture and dip in chart) Ref by Doctor     Severe pelvic pain     What office location does the patient prefer?:[place]     Have patient records been requested?:  If No, are the records showing in Epic:  Yes       INSURANCE:  Do we accept the patient's insurance or is the patient Self-Pay?: Yes    Insurance Provider: [address] [place] [address]: [id number]  Member ID#: [id number]      HISTORY:   Has the patient had any previous Urologist(s)?: No     Was the patient seen in the ED?: Yes seen on 3/12/23 for abdominal pain     Has the patient had any outside testing done?: No     Does the patient have a personal history of cancer?: Breast Cancer about 8 years ago       Patient Call Back- [phone]

## 2023-04-24 NOTE — TELEPHONE ENCOUNTER
Pt returned call to office, reports pelvic pain for over one month  Seen by gyn and referred to us  Reports pain is affecting everyday life  Reports pain is worse when getting up and down, feels like pubic bone is going to shatter  Denies any trouble with urination, no frequency, urgency, burning, nausea, vomiting  Appt scheduled for 04/2/23  ER precautions reviewed and pt reports understanding

## 2023-04-27 NOTE — PROGRESS NOTES
4/28/2023      Chief Complaint   Patient presents with   • New Patient Visit         Assessment and Plan    50 y o  female -- New patient    1  Pelvic pain  - UA today positive for leukocytes and blood  Negative for nitrites  Will send for micro and culture and call with results  - PVR today 22 mL  - Discussed conservative measures with adequate hydration, avoidance of bladder irritants, avoidance of constipation, double voiding, timed voiding  - Pelvic floor PT referral placed  -Continue to follow with OB/GYN for other causes of pelvic pain  - Discussed cystoscopy for further evaluation given ongoing symptoms  Ultrasound kidney and bladder also ordered  - Trial of hydroxyzine  Medication and side effects reviewed  - Call with any questions or concerns in the meantime  - All questions answered; patient understands and agrees with plan       History of Present Illness  Kennedi Dill is a 50 y o  female new patient here for evaluation of pelvic pain  Patient was referred to urology by OB/GYN for pelvic pain  Patient states she has been having pelvic pain for over 1 month  States that this does affect everyday life  Pain is worse when getting up and down and feels like pubic bone is going to shatter  Patient denies dysuria, frequency of urination, urgency, fever, chills, nausea, vomiting  Patient denies prior pelvic floor physical therapy  Patient was found to have atrophic vaginitis on OB/GYN exam, however, not currently on topical estrogen replacement  Of note, history of breast cancer  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 and underwent recent colposcopy  CT pelvis showing no bladder abnormalities  Recent urine testing negative for microscopic blood showing only 1-2 red blood cells per high-powered field, considered negative  Denies history of gross hematuria or recurrent urinary tract infections  Does have long tobacco history  Works as a  and does have chemical exposures   Denies family history "of  malignancies  Denies seeing urology in the past      Review of Systems   Constitutional: Negative for activity change, appetite change, chills and fever  HENT: Negative for congestion and trouble swallowing  Respiratory: Negative for cough and shortness of breath  Cardiovascular: Negative for chest pain, palpitations and leg swelling  Gastrointestinal: Negative for abdominal pain, constipation, diarrhea, nausea and vomiting  Genitourinary: Positive for pelvic pain  Negative for difficulty urinating, dysuria, flank pain, frequency, hematuria and urgency  Musculoskeletal: Negative for back pain and gait problem  Skin: Negative for wound  Allergic/Immunologic: Negative for immunocompromised state  Neurological: Negative for dizziness and syncope  Hematological: Does not bruise/bleed easily  Psychiatric/Behavioral: Negative for confusion  All other systems reviewed and are negative  Vitals  Vitals:    04/28/23 0832   BP: (!) 178/100   BP Location: Left arm   Patient Position: Sitting   Cuff Size: Adult   Pulse: 97   Resp: 18   SpO2: 98%   Weight: 66 2 kg (146 lb)   Height: 5' 5\" (1 651 m)       Physical Exam  Constitutional:       General: She is not in acute distress  Appearance: Normal appearance  She is not ill-appearing, toxic-appearing or diaphoretic  HENT:      Head: Normocephalic  Nose: No congestion  Eyes:      General: No scleral icterus  Right eye: No discharge  Left eye: No discharge  Conjunctiva/sclera: Conjunctivae normal       Pupils: Pupils are equal, round, and reactive to light  Pulmonary:      Effort: Pulmonary effort is normal    Musculoskeletal:      Cervical back: Normal range of motion  Skin:     General: Skin is warm and dry  Coloration: Skin is not jaundiced or pale  Findings: No bruising, erythema, lesion or rash  Neurological:      General: No focal deficit present        Mental Status: She is alert and " oriented to person, place, and time  Mental status is at baseline  Gait: Gait normal    Psychiatric:         Mood and Affect: Mood normal          Behavior: Behavior normal          Thought Content: Thought content normal          Judgment: Judgment normal            Past History  Past Medical History:   Diagnosis Date   • Abnormal Pap smear of cervix    • Cancer (HCC)     breast   • Hypertension      Social History     Socioeconomic History   • Marital status: /Civil Union     Spouse name: None   • Number of children: None   • Years of education: None   • Highest education level: None   Occupational History     Employer: [employer]   Tobacco Use   • Smoking status: Every Day     Packs/day: 0 50     Years: 30 00     Pack years: 15 00     Types: Cigarettes     Start date: 1990   • Smokeless tobacco: Never   Vaping Use   • Vaping Use: Never used   Substance and Sexual Activity   • Alcohol use: Yes     Alcohol/week: 4 0 standard drinks     Types: 4 Glasses of wine per week     Comment: socially   • Drug use: No   • Sexual activity: Not Currently   Other Topics Concern   • None   Social History Narrative    Activities;  Hiking    Uses seatbelts    Lives with family     Social Determinants of Health     Financial Resource Strain: Not on file   Food Insecurity: Not on file   Transportation Needs: Not on file   Physical Activity: Not on file   Stress: Not on file   Social Connections: Not on file   Intimate Partner Violence: Not on file   Housing Stability: Not on file     Social History     Tobacco Use   Smoking Status Every Day   • Packs/day: 0 50   • Years: 30 00   • Pack years: 15 00   • Types: Cigarettes   • Start date: 200   Smokeless Tobacco Never     Family History   Problem Relation Age of Onset   • Hypertension Mother    • Melanoma Mother    • Other Father         Brain Tumor   • Hypertension Father    • Pancreatic cancer Father    • Cancer Father    • Diabetes Paternal Grandmother    • Heart disease Maternal Grandfather    • Stroke Maternal Grandmother    • Breast cancer Neg Hx    • Colon cancer Neg Hx    • Ovarian cancer Neg Hx    • Uterine cancer Neg Hx    • Cervical cancer Neg Hx        The following portions of the patient's history were reviewed and updated as appropriate: allergies, current medications, past medical history, past social history, past surgical history and problem list     Results  Recent Results (from the past 1 hour(s))   POCT urine dip    Collection Time: 04/28/23  8:45 AM   Result Value Ref Range    LEUKOCYTE ESTERASE,UA ++     NITRITE,UA -     SL AMB POCT UROBILINOGEN 0 2     POCT URINE PROTEIN -      PH,UA 5     BLOOD,UA ++     SPECIFIC GRAVITY,UA 1 030     KETONES,UA -     BILIRUBIN,UA +     GLUCOSE, UA -      COLOR,UA yellow     CLARITY,UA clear    POCT Measure PVR    Collection Time: 04/28/23  8:49 AM   Result Value Ref Range    POST-VOID RESIDUAL VOLUME, ML POC 22 mL   ]  No results found for: PSA  Lab Results   Component Value Date    CALCIUM 9 8 03/12/2023    K 4 0 03/12/2023    CO2 25 03/12/2023     03/12/2023    BUN 10 03/12/2023    CREATININE 0 67 03/12/2023     Lab Results   Component Value Date    WBC 9 74 03/12/2023    HGB 16 0 (H) 03/12/2023    HCT 48 1 (H) 03/12/2023    MCV 93 03/12/2023     03/12/2023       Margret Maloney PA-C

## 2023-04-28 ENCOUNTER — CLINICAL SUPPORT (OUTPATIENT)
Dept: FAMILY MEDICINE CLINIC | Facility: CLINIC | Age: 48
End: 2023-04-28

## 2023-04-28 ENCOUNTER — OFFICE VISIT (OUTPATIENT)
Dept: UROLOGY | Facility: CLINIC | Age: 48
End: 2023-04-28

## 2023-04-28 VITALS — DIASTOLIC BLOOD PRESSURE: 82 MMHG | SYSTOLIC BLOOD PRESSURE: 152 MMHG

## 2023-04-28 VITALS
HEART RATE: 97 BPM | OXYGEN SATURATION: 98 % | RESPIRATION RATE: 18 BRPM | HEIGHT: 65 IN | WEIGHT: 146 LBS | DIASTOLIC BLOOD PRESSURE: 100 MMHG | BODY MASS INDEX: 24.32 KG/M2 | SYSTOLIC BLOOD PRESSURE: 178 MMHG

## 2023-04-28 DIAGNOSIS — R31.29 MICROSCOPIC HEMATURIA: ICD-10-CM

## 2023-04-28 DIAGNOSIS — I10 ESSENTIAL HYPERTENSION: Primary | ICD-10-CM

## 2023-04-28 DIAGNOSIS — R10.2 PELVIC PAIN: Primary | ICD-10-CM

## 2023-04-28 LAB
BACTERIA UR QL AUTO: ABNORMAL /HPF
BILIRUB UR QL STRIP: NEGATIVE
CLARITY UR: CLEAR
COLOR UR: ABNORMAL
GLUCOSE UR STRIP-MCNC: NEGATIVE MG/DL
HGB UR QL STRIP.AUTO: NEGATIVE
HYALINE CASTS #/AREA URNS LPF: ABNORMAL /LPF
KETONES UR STRIP-MCNC: NEGATIVE MG/DL
LEUKOCYTE ESTERASE UR QL STRIP: ABNORMAL
MUCOUS THREADS UR QL AUTO: ABNORMAL
NITRITE UR QL STRIP: NEGATIVE
NON-SQ EPI CELLS URNS QL MICRO: ABNORMAL /HPF
PH UR STRIP.AUTO: 5.5 [PH]
POST-VOID RESIDUAL VOLUME, ML POC: 22 ML
PROT UR STRIP-MCNC: ABNORMAL MG/DL
RBC #/AREA URNS AUTO: ABNORMAL /HPF
SL AMB  POCT GLUCOSE, UA: NORMAL
SL AMB LEUKOCYTE ESTERASE,UA: NORMAL
SL AMB POCT BILIRUBIN,UA: NORMAL
SL AMB POCT BLOOD,UA: NORMAL
SL AMB POCT CLARITY,UA: CLEAR
SL AMB POCT COLOR,UA: YELLOW
SL AMB POCT KETONES,UA: NORMAL
SL AMB POCT NITRITE,UA: NORMAL
SL AMB POCT PH,UA: 5
SL AMB POCT SPECIFIC GRAVITY,UA: 1.03
SL AMB POCT URINE PROTEIN: NORMAL
SL AMB POCT UROBILINOGEN: 0.2
SP GR UR STRIP.AUTO: 1.01 (ref 1–1.03)
UROBILINOGEN UR STRIP-ACNC: <2 MG/DL
WBC #/AREA URNS AUTO: ABNORMAL /HPF

## 2023-04-28 RX ORDER — HYDROXYZINE 50 MG/1
50 TABLET, FILM COATED ORAL
Qty: 90 TABLET | Refills: 3 | Status: SHIPPED | OUTPATIENT
Start: 2023-04-28

## 2023-04-28 NOTE — PROGRESS NOTES
Pt presents today for a nurse visit for a BP check  Pt states that she has been taking her Losartan-HCTZ and Metoprolol  Pt has been sitting for 5 mins prior to BP check  BP reading is 152/82 today

## 2023-04-30 LAB — BACTERIA UR CULT: NORMAL

## 2023-05-01 ENCOUNTER — TELEPHONE (OUTPATIENT)
Dept: FAMILY MEDICINE CLINIC | Facility: CLINIC | Age: 48
End: 2023-05-01

## 2023-05-01 NOTE — PROGRESS NOTES
Continue with BP medications as prescribed and follow up with PCP for continued BP management       Ian Cronin, [facility]  5/1/2023 9:54 AM

## 2023-05-01 NOTE — TELEPHONE ENCOUNTER
RHINAM RCB to notify pt that she should continue BP meds prescribed by Dr Andria Vogt and schedule an appt with Dr Marco A Alegria for her Annual PE and BP management

## 2023-05-18 ENCOUNTER — HOSPITAL ENCOUNTER (OUTPATIENT)
Dept: ULTRASOUND IMAGING | Facility: HOSPITAL | Age: 48
End: 2023-05-18

## 2023-05-18 DIAGNOSIS — R10.2 PELVIC PAIN: ICD-10-CM

## 2023-06-01 ENCOUNTER — PROCEDURE VISIT (OUTPATIENT)
Dept: UROLOGY | Facility: CLINIC | Age: 48
End: 2023-06-01

## 2023-06-01 VITALS
HEART RATE: 104 BPM | DIASTOLIC BLOOD PRESSURE: 122 MMHG | OXYGEN SATURATION: 97 % | SYSTOLIC BLOOD PRESSURE: 180 MMHG | BODY MASS INDEX: 25.26 KG/M2 | WEIGHT: 151.6 LBS | HEIGHT: 65 IN

## 2023-06-01 DIAGNOSIS — R10.2 PELVIC PAIN: ICD-10-CM

## 2023-06-01 DIAGNOSIS — N28.1 RENAL CYST: ICD-10-CM

## 2023-06-01 DIAGNOSIS — R31.29 MICROSCOPIC HEMATURIA: Primary | ICD-10-CM

## 2023-06-01 LAB
SL AMB  POCT GLUCOSE, UA: ABNORMAL
SL AMB LEUKOCYTE ESTERASE,UA: ABNORMAL
SL AMB POCT BILIRUBIN,UA: ABNORMAL
SL AMB POCT BLOOD,UA: ABNORMAL
SL AMB POCT CLARITY,UA: CLEAR
SL AMB POCT COLOR,UA: YELLOW
SL AMB POCT KETONES,UA: ABNORMAL
SL AMB POCT NITRITE,UA: ABNORMAL
SL AMB POCT PH,UA: 6
SL AMB POCT SPECIFIC GRAVITY,UA: 1
SL AMB POCT URINE PROTEIN: ABNORMAL
SL AMB POCT UROBILINOGEN: 0.2

## 2023-06-01 NOTE — PROGRESS NOTES
Patient with history of significant pelvic pain over the past few months  It has not improved  It does not respond to anti-inflammatory medication or pain medication  She finds that it is mostly bothersome with movement including walking and lifting  She denies dysuria, hematuria, urinary tract infection  She does not feel discomfort with a full bladder  She does have urinary urgency but this has been normal for her  Prior CT was done for pain which did not show any obvious abnormality  Ultrasound of urinary tract revealed small 5 mm renal lesion and some debris in the bladder  She is referred for cystoscopy  Cystoscopy     Date/Time 6/1/2023 8:30 AM     Performed by  Alejandro Robles MD   Authorized by Alejandro Robles MD         Procedure Details:  Procedure type: cystoscopy    Additional Procedure Details: Patient was prepped with correcting  2% lidocaine jelly was instilled  The  The 12 Afghan flexible scope was placed  Mucosa is completely smooth and normal   There is no suspicious mass or lesion noted  Both ureteral orifices are normal   Urethra is normal   There is no pain with filling or scope placement  There is no vaginal pain  Plan  Discussed that she has already been referred for pelvic floor therapy  I think this is reasonable  I would also suggest an MRI, as her pain localizes to the suprapubic area, possibly pubic bone and/or muscles  MRI will best identify this  She should have follow-up in the future for renal cyst/lesion which can be with an ultrasound in 1 year

## 2023-06-01 NOTE — LETTER
June 1, 2023     Dai Escamilla DO  [address]  [address] [place] [id number]    Patient: Narayan Patel   YOB: 1975   Date of Visit: 6/1/2023       Dear Dr Vy Salas: Thank you for referring Narayan Patel to me for evaluation  Below are my notes for this consultation  If you have questions, please do not hesitate to call me  I look forward to following your patient along with you  Sincerely,        Lázaro Rivera MD        CC: No Recipients    Lázaro Rivera MD  6/1/2023  9:52 AM  Sign when Signing Visit  Patient with history of significant pelvic pain over the past few months  It has not improved  It does not respond to anti-inflammatory medication or pain medication  She finds that it is mostly bothersome with movement including walking and lifting  She denies dysuria, hematuria, urinary tract infection  She does not feel discomfort with a full bladder  She does have urinary urgency but this has been normal for her  Prior CT was done for pain which did not show any obvious abnormality  Ultrasound of urinary tract revealed small 5 mm renal lesion and some debris in the bladder  She is referred for cystoscopy  Cystoscopy     Date/Time 6/1/2023 8:30 AM     Performed by  Lázaro Rivera MD   Authorized by Lázaro Rivera MD         Procedure Details:  Procedure type: cystoscopy    Additional Procedure Details: Patient was prepped with correcting  2% lidocaine jelly was instilled  The  The 12 Chadian flexible scope was placed  Mucosa is completely smooth and normal   There is no suspicious mass or lesion noted  Both ureteral orifices are normal   Urethra is normal   There is no pain with filling or scope placement  There is no vaginal pain  Plan  Discussed that she has already been referred for pelvic floor therapy  I think this is reasonable    I would also suggest an MRI, as her pain localizes to the suprapubic area, possibly pubic bone and/or muscles  MRI will best identify this  She should have follow-up in the future for renal cyst/lesion which can be with an ultrasound in 1 year

## 2025-04-14 ENCOUNTER — OFFICE VISIT (OUTPATIENT)
Dept: FAMILY MEDICINE CLINIC | Facility: CLINIC | Age: 50
End: 2025-04-14
Payer: COMMERCIAL

## 2025-04-14 ENCOUNTER — TELEPHONE (OUTPATIENT)
Age: 50
End: 2025-04-14

## 2025-04-14 VITALS
RESPIRATION RATE: 18 BRPM | HEIGHT: 65 IN | WEIGHT: 142 LBS | DIASTOLIC BLOOD PRESSURE: 108 MMHG | BODY MASS INDEX: 23.66 KG/M2 | HEART RATE: 82 BPM | SYSTOLIC BLOOD PRESSURE: 200 MMHG | OXYGEN SATURATION: 97 %

## 2025-04-14 DIAGNOSIS — I10 ESSENTIAL HYPERTENSION: Primary | ICD-10-CM

## 2025-04-14 DIAGNOSIS — I71.40 ABDOMINAL AORTIC ANEURYSM (AAA) WITHOUT RUPTURE, UNSPECIFIED PART (HCC): ICD-10-CM

## 2025-04-14 DIAGNOSIS — F41.9 ANXIETY: ICD-10-CM

## 2025-04-14 PROCEDURE — 93000 ELECTROCARDIOGRAM COMPLETE: CPT | Performed by: FAMILY MEDICINE

## 2025-04-14 PROCEDURE — 99214 OFFICE O/P EST MOD 30 MIN: CPT | Performed by: FAMILY MEDICINE

## 2025-04-14 RX ORDER — LOSARTAN POTASSIUM AND HYDROCHLOROTHIAZIDE 25; 100 MG/1; MG/1
1 TABLET ORAL DAILY
Qty: 90 TABLET | Refills: 1 | Status: SHIPPED | OUTPATIENT
Start: 2025-04-14

## 2025-04-14 RX ORDER — ESCITALOPRAM OXALATE 10 MG/1
10 TABLET ORAL DAILY
Qty: 30 TABLET | Refills: 5 | Status: CANCELLED | OUTPATIENT
Start: 2025-04-14 | End: 2025-10-11

## 2025-04-14 RX ORDER — METOPROLOL TARTRATE 25 MG/1
25 TABLET, FILM COATED ORAL EVERY 12 HOURS
Qty: 180 TABLET | Refills: 1 | Status: SHIPPED | OUTPATIENT
Start: 2025-04-14

## 2025-04-14 NOTE — PROGRESS NOTES
Name: Marixa Allen      : 1975      MRN: [id number]  Encounter Provider: Bettye Davila DO  Encounter Date: 2025   Encounter department: [facility] [address]  [facility]  Assessment & Plan  Essential hypertension    Orders:  •  losartan-hydrochlorothiazide (HYZAAR) 100-25 MG per tablet; Take 1 tablet by mouth daily  •  CBC and differential; Future  •  Comprehensive metabolic panel; Future  •  Lipid panel; Future  •  TSH, 3rd generation with Free T4 reflex; Future  •  POCT ECG  •  UA (URINE) with reflex to Scope; Future  •  Echo complete w/ contrast if indicated; Future  •  metoprolol tartrate (LOPRESSOR) 25 mg tablet; Take 1 tablet (25 mg total) by mouth every 12 (twelve) hours    Anxiety    Orders:  •  Ambulatory referral to Psych Services; Future    Abdominal aortic aneurysm (AAA) without rupture, unspecified part (HCC)    Orders:  •  VAS abdominal aorta/iliac duplex; Future        Depression Screening and Follow-up Plan: Patient's depression screening was positive with a PHQ-9 score of 5.   Patient assessed for underlying major depression. Brief counseling provided and recommend additional follow-up/re-evaluation next office visit. Looking to get established with a therapist.     Tobacco Cessation Counseling: Tobacco cessation counseling was provided. The patient is sincerely urged to quit consumption of tobacco. She is not ready to quit tobacco.       History of Present Illness     Patient presents to the office to discuss anxiety. States that she took of 3 days last week to help with her son's mental health crisis. He has been hearing voices. She is working to get him established with mental health services. He has been verbally aggressive and more isolated. She has concerns about self harm. States that she is looking for [place] paperwork for those 3 days.      JUDY-7 Flowsheet Screening    Flowsheet Row Most Recent Value   Over the last two weeks, how often have you  "been bothered by the following problems?     Feeling nervous, anxious, or on edge 2   Not being able to stop or control worrying 2   Worrying too much about different things 2   Trouble relaxing  2   Being so restless that it's hard to sit still 2   Becoming easily annoyed or irritable  2   Feeling afraid as if something awful might happen 2   How difficult have these problems made it for you to do your work, take care of things at home, or get along with other people?  Somewhat difficult   JUDY Score  14        PHQ-2/9 Depression Screening    Little interest or pleasure in doing things: 1 - several days  Feeling down, depressed, or hopeless: 1 - several days  Trouble falling or staying asleep, or sleeping too much: 1 - several days  Feeling tired or having little energy: 1 - several days  Poor appetite or overeatin - several days  Feeling bad about yourself - or that you are a failure or have let yourself or your family down: 0 - not at all  Trouble concentrating on things, such as reading the newspaper or watching television: 0 - not at all  Moving or speaking so slowly that other people could have noticed. Or the opposite - being so fidgety or restless that you have been moving around a lot more than usual: 0 - not at all  Thoughts that you would be better off dead, or of hurting yourself in some way: 0 - not at all  PHQ-9 Score: 5  PHQ-9 Interpretation: Mild depression       Denies chest pain, SOB, LE edema, abdominal pain, lightheadedness or dizziness. She has not been taking any of her BP medication for awhile. States that she is really bad with following up. She did not complete imaging for AAA ordered in     Review of Systems    Objective   BP (!) 200/108 (BP Location: Left arm, Patient Position: Sitting, Cuff Size: Standard)   Pulse 82   Resp 18   Ht 5' 5\" (1.651 m)   Wt 64.4 kg (142 lb)   SpO2 97%   BMI 23.63 kg/m²      Physical Exam  Vitals reviewed.   Constitutional:       General: She is " not in acute distress.     Appearance: Normal appearance.   HENT:      Head: Normocephalic and atraumatic.      Right Ear: External ear normal.      Left Ear: External ear normal.      Nose: Nose normal.      Mouth/Throat:      Mouth: Mucous membranes are moist.   Eyes:      Extraocular Movements: Extraocular movements intact.      Conjunctiva/sclera: Conjunctivae normal.   Cardiovascular:      Rate and Rhythm: Normal rate and regular rhythm.      Heart sounds: Normal heart sounds.   Pulmonary:      Effort: Pulmonary effort is normal.      Breath sounds: Normal breath sounds.   Abdominal:      General: Bowel sounds are normal. There is no distension.      Palpations: Abdomen is soft.      Tenderness: There is no abdominal tenderness.   Musculoskeletal:      Cervical back: Neck supple.      Right lower leg: No edema.      Left lower leg: No edema.   Lymphadenopathy:      Cervical: No cervical adenopathy.   Skin:     General: Skin is warm.      Capillary Refill: Capillary refill takes less than 2 seconds.      Findings: No rash.   Neurological:      Mental Status: She is alert. Mental status is at baseline.           DO Otto Pace [facility]  4/14/2025 8:36 AM

## 2025-04-14 NOTE — ASSESSMENT & PLAN NOTE
Orders:  •  losartan-hydrochlorothiazide (HYZAAR) 100-25 MG per tablet; Take 1 tablet by mouth daily  •  CBC and differential; Future  •  Comprehensive metabolic panel; Future  •  Lipid panel; Future  •  TSH, 3rd generation with Free T4 reflex; Future  •  POCT ECG  •  UA (URINE) with reflex to Scope; Future  •  Echo complete w/ contrast if indicated; Future  •  metoprolol tartrate (LOPRESSOR) 25 mg tablet; Take 1 tablet (25 mg total) by mouth every 12 (twelve) hours

## 2025-04-14 NOTE — TELEPHONE ENCOUNTER
Writer attempted to contact pt regarding referral for [facility] to verify services needed to place her on [facility] wait list. Lvm to call writer back.

## 2025-04-17 ENCOUNTER — TELEPHONE (OUTPATIENT)
Dept: FAMILY MEDICINE CLINIC | Facility: CLINIC | Age: 50
End: 2025-04-17

## 2025-04-17 NOTE — TELEPHONE ENCOUNTER
The form has been completed and scanned into her chart. Successfully faxed to fax number [phone].   Portal msg sent to pt to notify.     I have attached it to this message. Should she need anything else, she can call [phone].

## 2025-04-17 NOTE — TELEPHONE ENCOUNTER
2nd attempt to contact pt to verify services needed to place her on [employer] wait list. Lvm to call writer back.

## 2025-04-24 ENCOUNTER — TELEPHONE (OUTPATIENT)
Dept: FAMILY MEDICINE CLINIC | Facility: CLINIC | Age: 50
End: 2025-04-24

## 2025-04-24 NOTE — TELEPHONE ENCOUNTER
3rd attempt to contact pt to verify services needed to place her on [employer] wait list. Lvm to call writer back. Referral closed.

## 2025-05-01 ENCOUNTER — OFFICE VISIT (OUTPATIENT)
Dept: FAMILY MEDICINE CLINIC | Facility: CLINIC | Age: 50
End: 2025-05-01
Payer: COMMERCIAL

## 2025-05-01 VITALS
SYSTOLIC BLOOD PRESSURE: 118 MMHG | TEMPERATURE: 97.1 F | HEIGHT: 65 IN | OXYGEN SATURATION: 99 % | BODY MASS INDEX: 24.16 KG/M2 | WEIGHT: 145 LBS | HEART RATE: 72 BPM | DIASTOLIC BLOOD PRESSURE: 78 MMHG

## 2025-05-01 DIAGNOSIS — Z85.3 PERSONAL HISTORY OF BREAST CANCER: ICD-10-CM

## 2025-05-01 DIAGNOSIS — Z12.31 ENCOUNTER FOR SCREENING MAMMOGRAM FOR BREAST CANCER: ICD-10-CM

## 2025-05-01 DIAGNOSIS — I10 ESSENTIAL HYPERTENSION: ICD-10-CM

## 2025-05-01 DIAGNOSIS — Z12.11 COLON CANCER SCREENING: ICD-10-CM

## 2025-05-01 DIAGNOSIS — Z00.00 ANNUAL PHYSICAL EXAM: Primary | ICD-10-CM

## 2025-05-01 PROCEDURE — 99213 OFFICE O/P EST LOW 20 MIN: CPT | Performed by: FAMILY MEDICINE

## 2025-05-01 PROCEDURE — 99396 PREV VISIT EST AGE 40-64: CPT | Performed by: FAMILY MEDICINE

## 2025-05-01 NOTE — ASSESSMENT & PLAN NOTE
Complaint with HCTZ-losartan and metoprolol. Continue current management, follow up in 3 months.

## 2025-05-01 NOTE — PROGRESS NOTES
Adult Annual Physical  Name: Marixa Allen      : 1975      MRN: [id number]  Encounter Provider: Bettye Davila DO  Encounter Date: 2025   Encounter department: [facility] [address] [facility]    :  Assessment & Plan  Annual physical exam         Essential hypertension  Complaint with HCTZ-losartan and metoprolol. Continue current management, follow up in 3 months.        Colon cancer screening    Orders:  •  Ambulatory Referral to Gastroenterology; Future    Encounter for screening mammogram for breast cancer    Orders:  •  Mammo screening bilateral w 3d and cad; Future    Personal history of breast cancer    Orders:  •  Mammo screening bilateral w 3d and cad; Future        Preventive Screenings:    - Cervical cancer screening: screening up-to-date     Immunizations:  - Immunizations due: Prevnar 20, Tdap and Zoster (Shingrix)         History of Present Illness     Adult Annual Physical:  Patient presents for annual physical. Notes that she is talking to her therapist once weekly. Mental health is doing well.     Has been having some foot cramps at night.     Has been having some headaches, states that they are improving.     Checking BP at home, does not remember top number, bottom number of 77..     Diet and Physical Activity:  - Diet/Nutrition: no special diet. cutting soda out.  - Exercise: no formal exercise. active with work    Depression Screening:    - PHQ-9 Score: 1    General Health:  - Sleep: sleeps well.  - Hearing: normal hearing bilateral ears.  - Vision: no vision problems and most recent eye exam > 1 year ago.  - Dental: regular dental visits, brushes teeth twice daily and does not floss.    /GYN Health:  - Follows with GYN: yes.   - Menopause: postmenopausal.   - History of STDs: no    Review of Systems      Objective   /78 (BP Location: Left arm, Patient Position: Sitting, Cuff Size: Standard)   Pulse 72   Temp (!) 97.1 °F (36.2 °C) (Temporal)   " Ht 5' 5\" (1.651 m)   Wt 65.8 kg (145 lb)   SpO2 99%   BMI 24.13 kg/m²     Physical Exam  Vitals reviewed.   Constitutional:       General: She is not in acute distress.     Appearance: Normal appearance.   HENT:      Head: Normocephalic and atraumatic.      Right Ear: External ear normal.      Left Ear: External ear normal.      Nose: Nose normal.      Mouth/Throat:      Mouth: Mucous membranes are moist.   Eyes:      Extraocular Movements: Extraocular movements intact.      Conjunctiva/sclera: Conjunctivae normal.      Pupils: Pupils are equal, round, and reactive to light.   Cardiovascular:      Rate and Rhythm: Normal rate and regular rhythm.      Heart sounds: Normal heart sounds.   Pulmonary:      Effort: Pulmonary effort is normal.      Breath sounds: Normal breath sounds.   Abdominal:      General: Bowel sounds are normal. There is no distension.      Palpations: Abdomen is soft.      Tenderness: There is no abdominal tenderness.   Musculoskeletal:      Cervical back: Neck supple.      Right lower leg: No edema.      Left lower leg: No edema.   Lymphadenopathy:      Cervical: No cervical adenopathy.   Skin:     General: Skin is warm.      Capillary Refill: Capillary refill takes less than 2 seconds.      Findings: No rash.   Neurological:      Mental Status: She is alert. Mental status is at baseline.           DO Otto Pace [facility]  5/1/2025 7:41 AM        "

## 2025-05-14 ENCOUNTER — APPOINTMENT (OUTPATIENT)
Dept: LAB | Facility: CLINIC | Age: 50
End: 2025-05-14
Payer: COMMERCIAL

## 2025-05-14 DIAGNOSIS — I10 ESSENTIAL HYPERTENSION: ICD-10-CM

## 2025-05-14 LAB
ALBUMIN SERPL BCG-MCNC: 4.2 G/DL (ref 3.5–5)
ALP SERPL-CCNC: 386 U/L (ref 34–104)
ALT SERPL W P-5'-P-CCNC: 84 U/L (ref 7–52)
ANION GAP SERPL CALCULATED.3IONS-SCNC: 10 MMOL/L (ref 4–13)
AST SERPL W P-5'-P-CCNC: 32 U/L (ref 13–39)
BACTERIA UR QL AUTO: ABNORMAL /HPF
BASOPHILS # BLD MANUAL: 0.13 THOUSAND/UL (ref 0–0.1)
BASOPHILS NFR MAR MANUAL: 1 % (ref 0–1)
BILIRUB SERPL-MCNC: 0.84 MG/DL (ref 0.2–1)
BILIRUB UR QL STRIP: NEGATIVE
BUN SERPL-MCNC: 34 MG/DL (ref 5–25)
CALCIUM SERPL-MCNC: 10.1 MG/DL (ref 8.4–10.2)
CHLORIDE SERPL-SCNC: 96 MMOL/L (ref 96–108)
CHOLEST SERPL-MCNC: 325 MG/DL (ref ?–200)
CLARITY UR: CLEAR
CO2 SERPL-SCNC: 29 MMOL/L (ref 21–32)
COLOR UR: YELLOW
CREAT SERPL-MCNC: 0.96 MG/DL (ref 0.6–1.3)
EOSINOPHIL # BLD MANUAL: 0.26 THOUSAND/UL (ref 0–0.4)
EOSINOPHIL NFR BLD MANUAL: 2 % (ref 0–6)
ERYTHROCYTE [DISTWIDTH] IN BLOOD BY AUTOMATED COUNT: 13.1 % (ref 11.6–15.1)
GFR SERPL CREATININE-BSD FRML MDRD: 68 ML/MIN/1.73SQ M
GIANT PLATELETS BLD QL SMEAR: PRESENT
GLUCOSE P FAST SERPL-MCNC: 131 MG/DL (ref 65–99)
GLUCOSE UR STRIP-MCNC: NEGATIVE MG/DL
HCT VFR BLD AUTO: 45 % (ref 34.8–46.1)
HDLC SERPL-MCNC: 41 MG/DL
HGB BLD-MCNC: 14.2 G/DL (ref 11.5–15.4)
HGB UR QL STRIP.AUTO: NEGATIVE
HYALINE CASTS #/AREA URNS LPF: ABNORMAL /LPF
KETONES UR STRIP-MCNC: NEGATIVE MG/DL
LDLC SERPL CALC-MCNC: 241 MG/DL (ref 0–100)
LEUKOCYTE ESTERASE UR QL STRIP: NEGATIVE
LYMPHOCYTES # BLD AUTO: 27 % (ref 14–44)
LYMPHOCYTES # BLD AUTO: 4.64 THOUSAND/UL (ref 0.6–4.47)
MCH RBC QN AUTO: 30.9 PG (ref 26.8–34.3)
MCHC RBC AUTO-ENTMCNC: 31.6 G/DL (ref 31.4–37.4)
MCV RBC AUTO: 98 FL (ref 82–98)
MONOCYTES # BLD AUTO: 0.13 THOUSAND/UL (ref 0–1.22)
MONOCYTES NFR BLD: 1 % (ref 4–12)
NEUTROPHILS # BLD MANUAL: 7.73 THOUSAND/UL (ref 1.85–7.62)
NEUTS SEG NFR BLD AUTO: 60 % (ref 43–75)
NITRITE UR QL STRIP: NEGATIVE
NON-SQ EPI CELLS URNS QL MICRO: ABNORMAL /HPF
NONHDLC SERPL-MCNC: 284 MG/DL
PH UR STRIP.AUTO: 5.5 [PH]
PLATELET # BLD AUTO: 452 THOUSANDS/UL (ref 149–390)
PLATELET BLD QL SMEAR: ABNORMAL
PMV BLD AUTO: 10 FL (ref 8.9–12.7)
POTASSIUM SERPL-SCNC: 4.3 MMOL/L (ref 3.5–5.3)
PROT SERPL-MCNC: 8.6 G/DL (ref 6.4–8.4)
PROT UR STRIP-MCNC: ABNORMAL MG/DL
RBC # BLD AUTO: 4.6 MILLION/UL (ref 3.81–5.12)
RBC #/AREA URNS AUTO: ABNORMAL /HPF
RBC MORPH BLD: NORMAL
SODIUM SERPL-SCNC: 135 MMOL/L (ref 135–147)
SP GR UR STRIP.AUTO: 1.02 (ref 1–1.03)
TRIGL SERPL-MCNC: 213 MG/DL (ref ?–150)
TSH SERPL DL<=0.05 MIU/L-ACNC: 3.38 UIU/ML (ref 0.45–4.5)
UROBILINOGEN UR STRIP-ACNC: <2 MG/DL
VARIANT LYMPHS # BLD AUTO: 9 %
WBC # BLD AUTO: 12.88 THOUSAND/UL (ref 4.31–10.16)
WBC #/AREA URNS AUTO: ABNORMAL /HPF

## 2025-05-14 PROCEDURE — 80053 COMPREHEN METABOLIC PANEL: CPT

## 2025-05-14 PROCEDURE — 84443 ASSAY THYROID STIM HORMONE: CPT

## 2025-05-14 PROCEDURE — 85027 COMPLETE CBC AUTOMATED: CPT

## 2025-05-14 PROCEDURE — 81001 URINALYSIS AUTO W/SCOPE: CPT

## 2025-05-14 PROCEDURE — 80061 LIPID PANEL: CPT

## 2025-05-14 PROCEDURE — 85007 BL SMEAR W/DIFF WBC COUNT: CPT

## 2025-05-14 PROCEDURE — 36415 COLL VENOUS BLD VENIPUNCTURE: CPT

## 2025-05-15 ENCOUNTER — RESULTS FOLLOW-UP (OUTPATIENT)
Dept: FAMILY MEDICINE CLINIC | Facility: CLINIC | Age: 50
End: 2025-05-15

## 2025-05-15 ENCOUNTER — HOSPITAL ENCOUNTER (OUTPATIENT)
Dept: VASCULAR ULTRASOUND | Facility: HOSPITAL | Age: 50
Discharge: HOME | End: 2025-05-15
Attending: FAMILY MEDICINE
Payer: COMMERCIAL

## 2025-05-15 ENCOUNTER — HOSPITAL ENCOUNTER (OUTPATIENT)
Dept: NON INVASIVE DIAGNOSTICS | Facility: HOSPITAL | Age: 50
Discharge: HOME | End: 2025-05-15
Attending: FAMILY MEDICINE
Payer: COMMERCIAL

## 2025-05-15 VITALS
BODY MASS INDEX: 24.16 KG/M2 | DIASTOLIC BLOOD PRESSURE: 78 MMHG | HEIGHT: 65 IN | SYSTOLIC BLOOD PRESSURE: 118 MMHG | HEART RATE: 95 BPM | WEIGHT: 145 LBS

## 2025-05-15 DIAGNOSIS — R73.01 IMPAIRED FASTING GLUCOSE: ICD-10-CM

## 2025-05-15 DIAGNOSIS — I71.40 ABDOMINAL AORTIC ANEURYSM (AAA) WITHOUT RUPTURE, UNSPECIFIED PART (HCC): ICD-10-CM

## 2025-05-15 DIAGNOSIS — R74.8 ELEVATED ALKALINE PHOSPHATASE LEVEL: Primary | ICD-10-CM

## 2025-05-15 DIAGNOSIS — I10 ESSENTIAL HYPERTENSION: ICD-10-CM

## 2025-05-15 LAB
AORTIC ROOT: 2.9 CM
ASCENDING AORTA: 3 CM
BSA FOR ECHO PROCEDURE: 1.73 M2
E WAVE DECELERATION TIME: 217 MS
E/A RATIO: 0.41
FRACTIONAL SHORTENING: 28 (ref 28–44)
INTERVENTRICULAR SEPTUM IN DIASTOLE (PARASTERNAL SHORT AXIS VIEW): 1.2 CM
INTERVENTRICULAR SEPTUM: 1.2 CM (ref 0.6–1.1)
LA/AORTA RATIO 2D: 0.97
LAAS-AP2: 10.2 CM2
LAAS-AP4: 8.9 CM2
LEFT ATRIUM SIZE: 2.8 CM
LEFT INTERNAL DIMENSION IN SYSTOLE: 2.6 CM (ref 2.1–4)
LEFT VENTRICULAR INTERNAL DIMENSION IN DIASTOLE: 3.6 CM (ref 3.5–6)
LEFT VENTRICULAR POSTERIOR WALL IN END DIASTOLE: 1 CM
LEFT VENTRICULAR STROKE VOLUME: 30 ML
LV EF US.2D.A4C+ESTIMATED: 56 %
LVSV (TEICH): 30 ML
MV E'TISSUE VEL-SEP: 4 CM/S
MV PEAK A VEL: 1.13 M/S
MV PEAK E VEL: 46 CM/S
MV STENOSIS PRESSURE HALF TIME: 64 MS
MV VALVE AREA P 1/2 METHOD: 3.44
RA PRESSURE ESTIMATED: 3 MMHG
RIGHT VENTRICLE ID DIMENSION: 1.9 CM
SL CV LV EF: 62
SL CV PED ECHO LEFT VENTRICLE DIASTOLIC VOLUME (MOD BIPLANE) 2D: 54 ML
SL CV PED ECHO LEFT VENTRICLE SYSTOLIC VOLUME (MOD BIPLANE) 2D: 24 ML
TRICUSPID ANNULAR PLANE SYSTOLIC EXCURSION: 1.7 CM

## 2025-05-15 PROCEDURE — 93306 TTE W/DOPPLER COMPLETE: CPT

## 2025-05-15 PROCEDURE — 93306 TTE W/DOPPLER COMPLETE: CPT | Performed by: INTERNAL MEDICINE

## 2025-05-15 PROCEDURE — 93978 VASCULAR STUDY: CPT

## 2025-05-15 PROCEDURE — 93978 VASCULAR STUDY: CPT | Performed by: INTERNAL MEDICINE

## 2025-05-20 ENCOUNTER — HOSPITAL ENCOUNTER (OUTPATIENT)
Dept: MAMMOGRAPHY | Facility: CLINIC | Age: 50
Discharge: HOME | End: 2025-05-20
Attending: FAMILY MEDICINE
Payer: COMMERCIAL

## 2025-05-20 ENCOUNTER — TELEPHONE (OUTPATIENT)
Age: 50
End: 2025-05-20

## 2025-05-20 ENCOUNTER — HOSPITAL ENCOUNTER (OUTPATIENT)
Dept: ULTRASOUND IMAGING | Facility: HOSPITAL | Age: 50
Discharge: HOME | End: 2025-05-20
Attending: FAMILY MEDICINE
Payer: COMMERCIAL

## 2025-05-20 DIAGNOSIS — Z85.3 PERSONAL HISTORY OF BREAST CANCER: ICD-10-CM

## 2025-05-20 DIAGNOSIS — K76.9 LIVER LESION: Primary | ICD-10-CM

## 2025-05-20 DIAGNOSIS — Z12.31 ENCOUNTER FOR SCREENING MAMMOGRAM FOR BREAST CANCER: ICD-10-CM

## 2025-05-20 DIAGNOSIS — R74.8 ELEVATED ALKALINE PHOSPHATASE LEVEL: ICD-10-CM

## 2025-05-20 PROCEDURE — 77063 BREAST TOMOSYNTHESIS BI: CPT

## 2025-05-20 PROCEDURE — 77067 SCR MAMMO BI INCL CAD: CPT

## 2025-05-20 PROCEDURE — 76705 ECHO EXAM OF ABDOMEN: CPT

## 2025-05-20 NOTE — TELEPHONE ENCOUNTER
Pt called in stating she received her results for US RIGHT UPPER QUADRANT and is very concerned about them. Inquired if Dr. Davila had reviewed them as of yet?    Reviewed chart & did not see any results to relay.     Pt is requesting for Dr. Davila to please review her results?    Please advise & call pt back with update on results after they have been reviewed. Thank you!    Marixa: [phone]

## 2025-05-20 NOTE — TELEPHONE ENCOUNTER
Discussed US results, CT C/A/P ordered STAT for completion in order to location primary malignancy.     Bettye Davila DO  [facility]  5/20/2025 4:20 PM

## 2025-05-21 ENCOUNTER — TELEPHONE (OUTPATIENT)
Age: 50
End: 2025-05-21

## 2025-05-21 NOTE — TELEPHONE ENCOUNTER
Patient called in to have results of mammogram this is still in process. Pt will call to schedule CT.

## 2025-05-22 ENCOUNTER — TELEPHONE (OUTPATIENT)
Age: 50
End: 2025-05-22

## 2025-05-22 ENCOUNTER — HOSPITAL ENCOUNTER (OUTPATIENT)
Dept: CT IMAGING | Facility: HOSPITAL | Age: 50
End: 2025-05-22
Attending: FAMILY MEDICINE
Payer: COMMERCIAL

## 2025-05-22 DIAGNOSIS — C78.7 MALIGNANT NEOPLASM METASTATIC TO LIVER (HCC): Primary | ICD-10-CM

## 2025-05-22 DIAGNOSIS — K76.9 LIVER LESION: ICD-10-CM

## 2025-05-22 DIAGNOSIS — C76.2 ABDOMINAL CARCINOMATOSIS (HCC): ICD-10-CM

## 2025-05-22 PROCEDURE — 74177 CT ABD & PELVIS W/CONTRAST: CPT

## 2025-05-22 PROCEDURE — 71260 CT THORAX DX C+: CPT

## 2025-05-22 RX ADMIN — IOHEXOL 100 ML: 350 INJECTION, SOLUTION INTRAVENOUS at 15:03

## 2025-05-22 NOTE — PROGRESS NOTES
Called patient and discussed CT scan results. Answered all questions. Referral placed to surgical oncology for biopsy and evaluation.     Bettye Davila DO  [facility]  5/22/2025 5:11 PM

## 2025-05-27 ENCOUNTER — DOCUMENTATION (OUTPATIENT)
Dept: HEMATOLOGY ONCOLOGY | Facility: CLINIC | Age: 50
End: 2025-05-27

## 2025-05-27 ENCOUNTER — TELEPHONE (OUTPATIENT)
Dept: FAMILY MEDICINE CLINIC | Facility: CLINIC | Age: 50
End: 2025-05-27

## 2025-05-27 ENCOUNTER — TELEPHONE (OUTPATIENT)
Age: 50
End: 2025-05-27

## 2025-05-27 ENCOUNTER — PATIENT OUTREACH (OUTPATIENT)
Dept: HEMATOLOGY ONCOLOGY | Facility: CLINIC | Age: 50
End: 2025-05-27

## 2025-05-27 DIAGNOSIS — C76.2 ABDOMINAL CARCINOMATOSIS (HCC): Primary | ICD-10-CM

## 2025-05-27 DIAGNOSIS — C78.6 PERITONEAL CARCINOMATOSIS (HCC): Primary | ICD-10-CM

## 2025-05-27 DIAGNOSIS — K76.9 LIVER LESION: ICD-10-CM

## 2025-05-27 NOTE — TELEPHONE ENCOUNTER
Patient scheduled at soonest appt, 6/9 at 10:00 am with Dr Garvey at [facility]. Outside of recommended time frame of 3 days, please call her back with options if there are any sooner appts.

## 2025-05-27 NOTE — PROGRESS NOTES
"Initial outreach. Spoke to Marixa Allen. Introduced myself and explained the role of an Oncology Nurse Navigator to assist with coordination of cancer care, preparation for upcoming appointment, be a point of contact prior to oncology consult, provide support and connect her with available resources. Discussed Surgical and medical oncology referral placed by Dr Bettye Davila. Explained I will be their main contact until they are established with their team and a treatment plan is established.     Assessed for barriers of care.   General assessment completed Yes    Do you have a history of cancer? yes - Breast  Have you ever received Radiation Therapy? Yes to the breast (R side)  Where  did you receive RT? [facility]   When? 11 yrs ago  Pt was tx with lumpectomy and radiation. She had surgery with [facility] and [facility].    R breast \"weird ever since the cancer\" No changes in the last few years, worried about the skin and shrinkage. But the pt denies breast changes.    Do you have any implantable devices?   [] Pacemaker  [] Pain stimulator  [] Defibrillator  [] Implanted sleep apnea device  [x] NONE    NVD/constipation: No Always had bowel issues with diarrhea and once noted to having nausea with the pain.  Weight Loss: No  Appetite change: No  Fatigue: Yes  Pain: Yes Some R sided discomfort after pt would eat and subsided, felt more bloated, denies actual pain  Other: Pt reports feeling off  Pt notes uncontrollable itching in the abdomen and in the back but has subsided since a few weeks ago.    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 Bettye Davila  Insurance: [employer]    Reviewed upcoming appointments including date, time and location.  Future Appointments   Date Time Provider Department [place]   8/14/2025  7:30 AM DO DANK Pace  [facility]       Introduced Allison Wheeler, and explained she will be her patient navigator, who will reach out after the first " consult to introduce themselves. The PN will provide support, make sure she has a good understanding of the plan and schedule and to connect with additional resources if/when needed.     My direct contact information provided. Patient is aware that she can call me should she need any further assistance. Patient was appreciative of assistance.     Pt states her son is graduating the [employer] on 6/25-6/26 she will be traveling to [place] and patient will not plan on starting treatment until after.     Call to the reading room for the pt's mammogram to be read.

## 2025-05-27 NOTE — PROGRESS NOTES
Chart rvw'd on 5/27/2025    Referring provider-  Dr. Bettye Davila      Pathology-  NONE    Labs-  CBC/CMP    Imaging-  5/22/2025 CT CAP w contrast-  Innumerable hepatic lesions compatible with metastatic disease. Peritoneal carcinomatosis. Correlation with tissue sampling is recommended.     Infrarenal abdominal aortic saccular aneurysm measuring up to 4.5 cm in anteroposterior diameter, previously 4 cm in 2023. Vascular surgery consultation is recommended.     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    Scheduled appointments-  N/A    Surgical date-  N/A    Post surgical pathology-  N/A

## 2025-05-28 ENCOUNTER — PATIENT OUTREACH (OUTPATIENT)
Dept: HEMATOLOGY ONCOLOGY | Facility: CLINIC | Age: 50
End: 2025-05-28

## 2025-05-28 ENCOUNTER — PREP FOR PROCEDURE (OUTPATIENT)
Dept: INTERVENTIONAL RADIOLOGY/VASCULAR | Facility: CLINIC | Age: 50
End: 2025-05-28

## 2025-05-28 DIAGNOSIS — Z85.3 PERSONAL HISTORY OF BREAST CANCER: Primary | ICD-10-CM

## 2025-05-28 NOTE — PROGRESS NOTES
"On and off discussion today with the pt regarding her appts. Pt expressed frustration and confusion regarding the appt set up. I worked with her today in regards to getting set up to seeing a provider asap to at least start off discussion about the findings to her scans, give her an idea of what to anticipate for workup, diagnosis, and provide her reassurance. She expressed to me her upset with how she has not been fully informed of her diagnosis and not been told by a physician if she is stage IV cancer. She stated her PCP told her she has \"liver cancer\" but that was all she was told by a doctor. Made her aware that Zhane would be able to discuss further details regarding the findings on imaging and she would be the bridge from the pt to the provider. She agreed to be seen by a NP. She asked about a second opinion, I did tell her that if she wanted to seek a second opinion we encourage this for her. I provided her emotional support throughout and explained thoroughly we were working on getting her seen with medical oncology asap, as well as getting her liver bx scheduled as soon as we can.   "

## 2025-05-29 ENCOUNTER — DOCUMENTATION (OUTPATIENT)
Dept: HEMATOLOGY ONCOLOGY | Facility: CLINIC | Age: 50
End: 2025-05-29

## 2025-05-29 NOTE — PRE-PROCEDURE INSTRUCTIONS
Pre-procedure Instructions for Interventional Radiology  [address] [address]  INTERVENTIONAL RADIOLOGY [phone]    You are scheduled for a/an liver biopsy.    On Wednesday 6-4-25.    Your tentative arrival time is 9:15am.  Short stay will notify you the day before your procedure with the exact arrival time and the location to arrive.    To prepare for your procedure:  Please arrange for someone to drive you home after the procedure and stay with you until the next morning if you are instructed to do so.  This is typically for patients receiving some type of sedative or anesthetic for the procedure.  DO NOT EAT OR DRINK ANYTHING after midnight on the evening before your procedure including candy & gum.  ONLY SIPS OF WATER with your medications are allowed on the morning of your procedure.  TAKE ALL OF YOUR REGULAR MEDICATIONS THE MORNING OF YOUR PROCEDURE with sips of water!  We may call you to stop some of your blood sugar, blood pressure and blood thinning medications depending on the procedure.  Please take all of these medications unless we instruct you to stop them.  If you have an allergy to x-ray dye, please contact Interventional Radiology for an x-ray dye preparation which usually consists of an oral steroid and Benadryl.  If you wear a Glucose Monitor, you may be asked to remove it for your procedure if we are using x-ray.  These devices need to be removed when we are imaging with x-ray near the device since the radiation can cause the unit to malfunction.  If possible and not too inconvenient, you may want to schedule your exam closer to day 14 of your 14 day device so your device is not wasted.    The day of your procedure:  Bring a list of the medications you take at home.  Bring medications you take for breathing problems (such as inhalers), medications for chest pain, or both.  Bring a case for your glasses or contacts.  Bring your insurance card and a  form of photo ID.  Please leave all valuables such as credit cards and jewelry at home.  Report to the admitting office to the left of the registration desk in the main lobby at the [facility], Entrance B.  You will then be directed to the Short Stay Center.  While your procedure is being performed, your family may wait in the Radiology Waiting Room on the 1st floor in Radiology.  if they need to leave, they may provide a number to be called following the procedure.   Be prepared to stay overnight just in case. Sometimes procedures will indicate the need for further observation or treatment.   If you are scheduled for a follow-up visit with the Interventional Radiologist after your procedure, you will be called with a date and time.    Special Instructions (Medications to stop taking before your procedure etc.):  No aspirin products for 5 days before the procedure.  Take your blood pressure medications the morning of the procedure.

## 2025-05-29 NOTE — PROGRESS NOTES
Call made to IR scheduling to call patient today to set up her liver biopsy. Told a call will be made.

## 2025-05-30 ENCOUNTER — CONSULT (OUTPATIENT)
Dept: SURGICAL ONCOLOGY | Facility: CLINIC | Age: 50
End: 2025-05-30
Payer: COMMERCIAL

## 2025-05-30 VITALS
HEART RATE: 59 BPM | DIASTOLIC BLOOD PRESSURE: 60 MMHG | TEMPERATURE: 97.1 F | BODY MASS INDEX: 23.49 KG/M2 | HEIGHT: 65 IN | SYSTOLIC BLOOD PRESSURE: 102 MMHG | WEIGHT: 141 LBS | OXYGEN SATURATION: 99 %

## 2025-05-30 DIAGNOSIS — C78.7 CARCINOMA METASTATIC TO LIVER WITH UNKNOWN PRIMARY SITE (HCC): Primary | ICD-10-CM

## 2025-05-30 DIAGNOSIS — I71.40 ABDOMINAL ANEURYSM (HCC): ICD-10-CM

## 2025-05-30 DIAGNOSIS — Z85.3 PERSONAL HISTORY OF BREAST CANCER: ICD-10-CM

## 2025-05-30 DIAGNOSIS — C80.1 CARCINOMA METASTATIC TO LIVER WITH UNKNOWN PRIMARY SITE (HCC): Primary | ICD-10-CM

## 2025-05-30 DIAGNOSIS — Z80.0 FAMILY HISTORY OF PANCREATIC CANCER: ICD-10-CM

## 2025-05-30 PROCEDURE — 99244 OFF/OP CNSLTJ NEW/EST MOD 40: CPT

## 2025-05-30 NOTE — PROGRESS NOTES
Name: Marixa Allen      : 1975      MRN: [id number]  Encounter Provider: PIA Hickey  Encounter Date: 2025   Encounter department: [facility]  :  Assessment & Plan  Carcinoma metastatic to liver with unknown primary site (HCC)  We have thoroughly reviewed her imaging, and she is aware that she has what appears to be stage IV cancer of an unknown primary.  We have candidly discussed the nature of her disease, which is likely treatable but not curable, and the consequences if she chooses to forego any treatment.  She understands that the nature of her treatment will depend on the primary site of disease, whether this is breast, GI, or gynecologic, and would consist of chemotherapy and/or immunotherapy.  She also expresses understanding that there is no role for surgical resection at this time. IR biopsy of the liver is already scheduled for next week.  She is aware that it may take several weeks to get final results, and that it is possible that the IR biopsy may not yield enough tissue for a definitive diagnosis.  Should she require laparoscopic biopsy, I will arrange for her to be seen by one of our surgeons expeditiously. I will refer her for urgent genetic testing at this time, and have also provided a referral for Social Work. She is currently scheduled for consultation with Dr Conn on .  I will help coordinate IR port placement closer to home closely following this appointment.  I have discussed the importance of starting treatment as soon as possible. I have also briefly reviewed the purpose of a port and how it is placed, although she states she is already familiar with them. She has been encouraged to reach out to me with any concerns or questions.    Orders:  •  Ambulatory Referral to Interventional Radiology; Future  •  Ambulatory Referral to Social Work Care Management Program; Future  •  Ambulatory Referral to Genetics;  Future    Family history of pancreatic cancer    Orders:  •  Ambulatory Referral to Genetics; Future    Personal history of breast cancer    Orders:  •  Ambulatory Referral to Genetics; Future    Abdominal aneurysm (HCC)  The patient is aware of this finding on CT scan, and has seen vascular surgery in the past.            History of Present Illness   Chief Complaint   Patient presents with   • Consult     Return with new questions or concerns.    Pertinent Medical History   This is a very pleasant 48 y/o female who presents to the office today in consultation for newly discovered stage IV cancer.  She is accompanied by her brother, and is seen today with the primary intent of providing information and answering questions.  She states she was recently found to have elevated liver enzymes on routine bloodwork. RUQ US was subsequently performed on May 20, which revealed multiple liver lesions, the largest measuring 4.4cm in greatest dimension. In addition, there was also a 2.5cm hypoechoic lesion present adjacent to the head of the pancreas, questionable lymph node versus exophytic pancreatic lesion.  She was then sent for CT of the chest, abdomen and pelvis, which was performed 2 days later. This demonstrated innumerable hepatic lesions consistent with metastatic disease, as well extensive peritoneal carcinomatosis, with the largest implant measuring 6.5cm. In addition to a previously identified infrarenal AAA, there also appears to be occlusion of the right superficial femoral artery.  There did not appear to be any obvious primary tumor on imaging. The patient states she was very surprised by the findings. She reports that other than some generalized fatigue, she is not experiencing any symptoms.  She reports chronic diarrhea for many years. She denies N/V, bowel changes, bloody or dark stools, or abdominal pain, although she did have a brief episode of RUQ discomfort that she attributed to gas pain.  She reports  her appetite is great and she has not lost any weight.  She denies headaches, dizziness, SOB or cough, night sweats, fever or chills.  She does report low pelvic pain and pressure 2 years ago and was referred for pelvic floor therapy, but denies any currently gyn or pelvic complaints.  She believes she was HPV+ on a recent pap, but was then told everything was fine.  She has a history of a [id number] right breast IDC, ER/ND strongly positive, Her2 negative, treated in  with breast conservation therapy and radiation.  She states she was told chemotherapy was not necessary, and she was only able to tolerate about 1 and a 1/2 years of endocrine therapy (Tamoxifen).  Recent screening mammogram was unremarkable and she denies any breast complaints.   The patient did have genetic testing performed in , but this was only for BRCA1/2 and was negative.  She reports that her father had pancreatic cancer in his 60's, but  from recurrent brain cancer. Her mother had melanoma.  Both of her grandfathers  very young.  She is not aware of any breast, ovarian or colon cancer in her family.  The patient is aware that she is scheduled for IR biopsy of the liver next week, and is also aware that she has been referred to Palliative Care, which she is agreeable to.  She is currently scheduled to see Medical Oncology on .        Oncology History   Cancer Staging   No matching staging information was found for the patient.  Oncology History   Personal history of breast cancer   2014 Biopsy    Right breast, 10:00 9cmfn  Invasive ductal carcinoma  Grade 1-2  , PR90, Her2 0 (neg)     2014 Surgery    Right breast lumpectomy with sentinel lymph node biopsy  0/ LN  1.8cm tumor     2014 -  Cancer Staged    [id number]         2025 Initial Diagnosis    Personal history of breast cancer          Review of Systems A complete review of systems is negative other than that noted above in the HPI.          "  Objective   /60   Pulse 59   Temp (!) 97.1 °F (36.2 °C)   Ht 5' 5\" (1.651 m)   Wt 64 kg (141 lb)   SpO2 99%   BMI 23.46 kg/m²     Pain Screening:  Pain Score: 0-No pain  ECOG    Physical Exam  Vitals reviewed.   Constitutional:       General: She is not in acute distress.     Appearance: Normal appearance. She is normal weight. She is not ill-appearing or toxic-appearing.   HENT:      Head: Normocephalic and atraumatic.     Eyes:      General: No scleral icterus.      Cardiovascular:      Rate and Rhythm: Normal rate.   Pulmonary:      Effort: Pulmonary effort is normal.     Musculoskeletal:         General: Normal range of motion.      Cervical back: Normal range of motion and neck supple.     Skin:     General: Skin is warm and dry.      Coloration: Skin is not jaundiced.     Neurological:      General: No focal deficit present.      Mental Status: She is alert and oriented to person, place, and time.     Psychiatric:         Mood and Affect: Mood normal.         Behavior: Behavior normal.         Thought Content: Thought content normal.         Judgment: Judgment normal.            Radiology Results Review: I have reviewed radiology reports from May 2025 including: CT chest, CT abdomen/pelvis, and Ultrasound(s).    CT chest abdomen pelvis w contrast  5/22/2025    Narrative & Impression   CT CHEST, ABDOMEN AND PELVIS WITH IV CONTRAST     INDICATION: K76.9: Liver disease, unspecified.     COMPARISON: CT abdomen pelvis 3/12/2023. Right upper quadrant ultrasound 5/20/2025.     TECHNIQUE: CT examination of the chest, abdomen and pelvis was performed. Multiplanar 2D reformatted images were created from the source data.     This examination, like all CT scans performed in the [facility], was performed utilizing techniques to minimize radiation dose exposure, including the use of iterative reconstruction and automated exposure control. Radiation dose length   product (DLP) for this visit: " 579 mGy-cm.     IV Contrast: 100 mL of iohexol (OMNIPAQUE)  Enteric Contrast: Not administered.     FINDINGS:     CHEST     LUNGS: Patent central airways. Emphysematous changes. Mild mosaic attenuation of the lung parenchyma.     PLEURA: Unremarkable.     HEART/GREAT VESSELS: Heart is normal in size. Coronary artery calcifications. No thoracic aortic aneurysm.     MEDIASTINUM AND STANLEY: Right cardiophrenic lymph node measures up to 1 cm in short axis dimension (2/79).     CHEST WALL AND LOWER NECK: Right breast surgical clips.     ABDOMEN     LIVER/BILIARY TREE: Innumerable hypodense lesions compatible with metastatic disease. The largest in the right hepatic lobe measures up to 2.7 cm. Mild hepatomegaly.     GALLBLADDER: No calcified gallstones. No pericholecystic inflammatory change.     SPLEEN: Unremarkable.     PANCREAS: Unremarkable.     ADRENAL GLANDS: Unremarkable.     KIDNEYS/URETERS: Unremarkable. No hydronephrosis.     STOMACH AND BOWEL: Colonic diverticulosis without findings of acute diverticulitis.     APPENDIX: No findings to suggest appendicitis.     ABDOMINOPELVIC CAVITY: No ascites. No pneumoperitoneum. Peritoneal carcinomatosis, the largest implant measures 6.5 x 2.5 cm (2/167).     VESSELS: Atherosclerosis. Infrarenal abdominal aortic saccular aneurysm measuring up to 4.5 cm in anteroposterior diameter, previously 4.0 cm when measured at the same level in 2023. Focal ectasia of the aorta more inferiorly up to 3.1 cm (2/142).   Apparent occlusion of the right superficial femoral artery (2/235).     PELVIS     REPRODUCTIVE ORGANS: Unremarkable for patient's age.     URINARY BLADDER: Unremarkable.     ABDOMINAL WALL/INGUINAL REGIONS: Unremarkable.     BONES: No acute fracture or suspicious osseous lesion.     IMPRESSION:     Innumerable hepatic lesions compatible with metastatic disease. Peritoneal carcinomatosis. Correlation with tissue sampling is recommended.     Infrarenal abdominal aortic  saccular aneurysm measuring up to 4.5 cm in anteroposterior diameter, previously 4 cm in 2023. Vascular surgery consultation is recommended.     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          US right upper quadrant  5/20/2025    Narrative & Impression   RIGHT UPPER QUADRANT ULTRASOUND     INDICATION: R74.8: Abnormal levels of other serum enzymes.     COMPARISON: None     TECHNIQUE: Real-time ultrasound of the right upper quadrant was performed with a curvilinear transducer with both volumetric sweeps and still imaging techniques.     FINDINGS:     PANCREAS: Visualized portions of the pancreas are within normal limits.  A hypoechoic lesion seen in the portacaval region in relation to the head of, measuring 1.7 x 2.5 x 1.5 cm, this may be a portocaval lymph node or a primary pancreatic lesion  AORTA AND IVC: Visualized portions are normal for patient age.     LIVER:  Size: Enlarged the liver measures 18.8 cm in the midclavicular line.  Contour: Surface contour is smooth.  Parenchyma: Echogenicity and echotexture are within normal limits.  Multiple hepatic lesions with target appearance  There is a 3.3 x 3.7 x 2.5 cm left hepatic lobe lesion  A 4.4 x 3.9 x 2.3 cm left hepatic lobe lesion and additional 2.6 x 3.4 x 4.2 cm right hepatic lobe lesion  Limited imaging of the main portal vein shows it to be patent and hepatopetal.     BILIARY:  Phrygian cap with echogenic foci with comet tail artifact, suggesting adenomyomatosis. Additional 4 mm nonmobile focus in the fundus of the gallbladder in the phrygian cap suggest adherent calcification  No intrahepatic biliary dilatation.  CBD measures 8.0 mm.  No choledocholithiasis.     KIDNEY:  Right kidney measures 11.8 x 4.2 x 5.1 cm. Volume 134.3 mL  Kidney within normal limits.     ASCITES: None.  The visualized abdominal aorta is aneurysmal with mid aorta measuring 4.3 cm, previously on the CT it is measured at 3.9 cm  IMPRESSION:  Impression:  Multiple  hepatic lesions with target appearance, suggest metastatic disease.     A 1.7 x 2.5 x 1.5 cm hypoechoic lesion in the portacaval region in relation to the head of the pancreas, may be a portocaval lymph node or a primary pancreatic lesion.     MRI suggested for further evaluation     Abdominal aortic aneurysm with mid abdominal aorta measuring 4.3 cm, mildly larger from the previous study     Consider CT chest,  abdomen pelvis to evaluate for potential primary

## 2025-05-30 NOTE — ASSESSMENT & PLAN NOTE
We have thoroughly reviewed her imaging, and she is aware that she has what appears to be stage IV cancer of an unknown primary.  We have candidly discussed the nature of her disease, which is likely treatable but not curable, and the consequences if she chooses to forego any treatment.  She understands that the nature of her treatment will depend on the primary site of disease, whether this is breast, GI, or gynecologic, and would consist of chemotherapy and/or immunotherapy.  She also expresses understanding that there is no role for surgical resection at this time. IR biopsy of the liver is already scheduled for next week.  She is aware that it may take several weeks to get final results, and that it is possible that the IR biopsy may not yield enough tissue for a definitive diagnosis.  Should she require laparoscopic biopsy, I will arrange for her to be seen by one of our surgeons expeditiously. I will refer her for urgent genetic testing at this time, and have also provided a referral for Social Work. She is currently scheduled for consultation with Dr Conn on June 18.  I will help coordinate IR port placement closer to home closely following this appointment.  I have discussed the importance of starting treatment as soon as possible. I have also briefly reviewed the purpose of a port and how it is placed, although she states she is already familiar with them. She has been encouraged to reach out to me with any concerns or questions.    Orders:  •  Ambulatory Referral to Interventional Radiology; Future  •  Ambulatory Referral to Social Work Care Management Program; Future  •  Ambulatory Referral to Genetics; Future

## 2025-05-30 NOTE — LETTER
May 30, 2025     Familia Flores MD  [address] [facility]  2nd Floor  [facility] [address]    Patient: Marixa Allen   YOB: 1975   Date of Visit: 2025       Dear MD Marla Ruff MD Crystal Burke, RN  LNENY MEJÍA:    Thank you for referring Marixa Allen to me for evaluation. Below are my notes for this consultation.    If you have questions, please do not hesitate to call me. I look forward to following your patient along with you.         Sincerely,        PIA Hickey        CC: MD Bhavna Rodriguez, PIA Umaña  2025 12:46 PM  Sign when Signing Visit  Name: Marixa Allen      : 1975      MRN: [id number]  Encounter Provider: PIA Hickey  Encounter Date: 2025   Encounter department: [facility]  :  Assessment & Plan  Carcinoma metastatic to liver with unknown primary site (HCC)  We have thoroughly reviewed her imaging, and she is aware that she has what appears to be stage IV cancer of an unknown primary.  We have candidly discussed the nature of her disease, which is likely treatable but not curable, and the consequences if she chooses to forego any treatment.  She understands that the nature of her treatment will depend on the primary site of disease, whether this is breast, GI, or gynecologic, and would consist of chemotherapy and/or immunotherapy.  She also expresses understanding that there is no role for surgical resection at this time. IR biopsy of the liver is already scheduled for next week.  She is aware that it may take several weeks to get final results, and that it is possible that the IR biopsy may not yield enough tissue for a definitive diagnosis.  Should she require laparoscopic biopsy, I will arrange for her to be seen by one of our surgeons expeditiously. I will refer her for urgent genetic testing at this time, and have also provided a  referral for Social Work. She is currently scheduled for consultation with Dr Conn on June 18.  I will help coordinate IR port placement closer to home closely following this appointment.  I have discussed the importance of starting treatment as soon as possible. I have also briefly reviewed the purpose of a port and how it is placed, although she states she is already familiar with them. She has been encouraged to reach out to me with any concerns or questions.    Orders:  •  Ambulatory Referral to Interventional Radiology; Future  •  Ambulatory Referral to Social Work Care Management Program; Future  •  Ambulatory Referral to Genetics; Future    Family history of pancreatic cancer    Orders:  •  Ambulatory Referral to Genetics; Future    Personal history of breast cancer    Orders:  •  Ambulatory Referral to Genetics; Future    Abdominal aneurysm (HCC)  The patient is aware of this finding on CT scan, and has seen vascular surgery in the past.            History of Present Illness  Chief Complaint   Patient presents with   • Consult     Return with new questions or concerns.    Pertinent Medical History  This is a very pleasant 50 y/o female who presents to the office today in consultation for newly discovered stage IV cancer.  She is accompanied by her brother, and is seen today with the primary intent of providing information and answering questions.  She states she was recently found to have elevated liver enzymes on routine bloodwork. RUQ US was subsequently performed on May 20, which revealed multiple liver lesions, the largest measuring 4.4cm in greatest dimension. In addition, there was also a 2.5cm hypoechoic lesion present adjacent to the head of the pancreas, questionable lymph node versus exophytic pancreatic lesion.  She was then sent for CT of the chest, abdomen and pelvis, which was performed 2 days later. This demonstrated innumerable hepatic lesions consistent with metastatic disease, as well  extensive peritoneal carcinomatosis, with the largest implant measuring 6.5cm. In addition to a previously identified infrarenal AAA, there also appears to be occlusion of the right superficial femoral artery.  There did not appear to be any obvious primary tumor on imaging. The patient states she was very surprised by the findings. She reports that other than some generalized fatigue, she is not experiencing any symptoms.  She reports chronic diarrhea for many years. She denies N/V, bowel changes, bloody or dark stools, or abdominal pain, although she did have a brief episode of RUQ discomfort that she attributed to gas pain.  She reports her appetite is great and she has not lost any weight.  She denies headaches, dizziness, SOB or cough, night sweats, fever or chills.  She does report low pelvic pain and pressure 2 years ago and was referred for pelvic floor therapy, but denies any currently gyn or pelvic complaints.  She believes she was HPV+ on a recent pap, but was then told everything was fine.  She has a history of a Q2cC9C7 right breast IDC, ER/WY strongly positive, Her2 negative, treated in 2014 with breast conservation therapy and radiation.  She states she was told chemotherapy was not necessary, and she was only able to tolerate about 1 and a 1/2 years of endocrine therapy (Tamoxifen).  Recent screening mammogram was unremarkable and she denies any breast complaints.   The patient did have genetic testing performed in , but this was only for BRCA1/2 and was negative.  She reports that her father had pancreatic cancer in his 60's, but  from recurrent brain cancer. Her mother had melanoma.  Both of her grandfathers  very young.  She is not aware of any breast, ovarian or colon cancer in her family.  The patient is aware that she is scheduled for IR biopsy of the liver next week, and is also aware that she has been referred to Palliative Care, which she is agreeable to.  She is currently  "scheduled to see Medical Oncology on June 18.       Oncology History  Cancer Staging   No matching staging information was found for the patient.  Oncology History   Personal history of breast cancer   1/21/2014 Biopsy    Right breast, 10:00 9cmfn  Invasive ductal carcinoma  Grade 1-2  , PR90, Her2 0 (neg)     2/27/2014 Surgery    Right breast lumpectomy with sentinel lymph node biopsy  0/1 LN  1.8cm tumor     2/27/2014 -  Cancer Staged    [id number]         5/1/2025 Initial Diagnosis    Personal history of breast cancer         Review of Systems A complete review of systems is negative other than that noted above in the HPI.           Objective  /60   Pulse 59   Temp (!) 97.1 °F (36.2 °C)   Ht 5' 5\" (1.651 m)   Wt 64 kg (141 lb)   SpO2 99%   BMI 23.46 kg/m²     Pain Screening:  Pain Score: 0-No pain  ECOG    Physical Exam  Vitals reviewed.   Constitutional:       General: She is not in acute distress.     Appearance: Normal appearance. She is normal weight. She is not ill-appearing or toxic-appearing.   HENT:      Head: Normocephalic and atraumatic.     Eyes:      General: No scleral icterus.      Cardiovascular:      Rate and Rhythm: Normal rate.   Pulmonary:      Effort: Pulmonary effort is normal.     Musculoskeletal:         General: Normal range of motion.      Cervical back: Normal range of motion and neck supple.     Skin:     General: Skin is warm and dry.      Coloration: Skin is not jaundiced.     Neurological:      General: No focal deficit present.      Mental Status: She is alert and oriented to person, place, and time.     Psychiatric:         Mood and Affect: Mood normal.         Behavior: Behavior normal.         Thought Content: Thought content normal.         Judgment: Judgment normal.            Radiology Results Review: I have reviewed radiology reports from May 2025 including: CT chest, CT abdomen/pelvis, and Ultrasound(s).    CT chest abdomen pelvis w " contrast  5/22/2025    Narrative & Impression   CT CHEST, ABDOMEN AND PELVIS WITH IV CONTRAST     INDICATION: K76.9: Liver disease, unspecified.     COMPARISON: CT abdomen pelvis 3/12/2023. Right upper quadrant ultrasound 5/20/2025.     TECHNIQUE: CT examination of the chest, abdomen and pelvis was performed. Multiplanar 2D reformatted images were created from the source data.     This examination, like all CT [facility], was performed utilizing techniques to minimize radiation dose exposure, including the use of iterative reconstruction and automated exposure control. Radiation dose length   product (DLP) for this visit: 579 mGy-cm.     IV Contrast: 100 mL of iohexol (OMNIPAQUE)  Enteric Contrast: Not administered.     FINDINGS:     CHEST     LUNGS: Patent central airways. Emphysematous changes. Mild mosaic attenuation of the lung parenchyma.     PLEURA: Unremarkable.     HEART/GREAT VESSELS: Heart is normal in size. Coronary artery calcifications. No thoracic aortic aneurysm.     MEDIASTINUM AND STANLEY: Right cardiophrenic lymph node measures up to 1 cm in short axis dimension (2/79).     CHEST WALL AND LOWER NECK: Right breast surgical clips.     ABDOMEN     LIVER/BILIARY TREE: Innumerable hypodense lesions compatible with metastatic disease. The largest in the right hepatic lobe measures up to 2.7 cm. Mild hepatomegaly.     GALLBLADDER: No calcified gallstones. No pericholecystic inflammatory change.     SPLEEN: Unremarkable.     PANCREAS: Unremarkable.     ADRENAL GLANDS: Unremarkable.     KIDNEYS/URETERS: Unremarkable. No hydronephrosis.     STOMACH AND BOWEL: Colonic diverticulosis without findings of acute diverticulitis.     APPENDIX: No findings to suggest appendicitis.     ABDOMINOPELVIC CAVITY: No ascites. No pneumoperitoneum. Peritoneal carcinomatosis, the largest implant measures 6.5 x 2.5 cm (2/167).     VESSELS: Atherosclerosis. Infrarenal abdominal aortic  saccular aneurysm measuring up to 4.5 cm in anteroposterior diameter, previously 4.0 cm when measured at the same level in 2023. Focal ectasia of the aorta more inferiorly up to 3.1 cm (2/142).   Apparent occlusion of the right superficial femoral artery (2/235).     PELVIS     REPRODUCTIVE ORGANS: Unremarkable for patient's age.     URINARY BLADDER: Unremarkable.     ABDOMINAL WALL/INGUINAL REGIONS: Unremarkable.     BONES: No acute fracture or suspicious osseous lesion.     IMPRESSION:     Innumerable hepatic lesions compatible with metastatic disease. Peritoneal carcinomatosis. Correlation with tissue sampling is recommended.     Infrarenal abdominal aortic saccular aneurysm measuring up to 4.5 cm in anteroposterior diameter, previously 4 cm in 2023. Vascular surgery consultation is recommended.     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          US right upper quadrant  5/20/2025    Narrative & Impression   RIGHT UPPER QUADRANT ULTRASOUND     INDICATION: R74.8: Abnormal levels of other serum enzymes.     COMPARISON: None     TECHNIQUE: Real-time ultrasound of the right upper quadrant was performed with a curvilinear transducer with both volumetric sweeps and still imaging techniques.     FINDINGS:     PANCREAS: Visualized portions of the pancreas are within normal limits.  A hypoechoic lesion seen in the portacaval region in relation to the head of, measuring 1.7 x 2.5 x 1.5 cm, this may be a portocaval lymph node or a primary pancreatic lesion  AORTA AND IVC: Visualized portions are normal for patient age.     LIVER:  Size: Enlarged the liver measures 18.8 cm in the midclavicular line.  Contour: Surface contour is smooth.  Parenchyma: Echogenicity and echotexture are within normal limits.  Multiple hepatic lesions with target appearance  There is a 3.3 x 3.7 x 2.5 cm left hepatic lobe lesion  A 4.4 x 3.9 x 2.3 cm left hepatic lobe lesion and additional 2.6 x 3.4 x 4.2 cm right hepatic lobe  lesion  Limited imaging of the main portal vein shows it to be patent and hepatopetal.     BILIARY:  Phrygian cap with echogenic foci with comet tail artifact, suggesting adenomyomatosis. Additional 4 mm nonmobile focus in the fundus of the gallbladder in the phrygian cap suggest adherent calcification  No intrahepatic biliary dilatation.  CBD measures 8.0 mm.  No choledocholithiasis.     KIDNEY:  Right kidney measures 11.8 x 4.2 x 5.1 cm. Volume 134.3 mL  Kidney within normal limits.     ASCITES: None.  The visualized abdominal aorta is aneurysmal with mid aorta measuring 4.3 cm, previously on the CT it is measured at 3.9 cm  IMPRESSION:  Impression:  Multiple hepatic lesions with target appearance, suggest metastatic disease.     A 1.7 x 2.5 x 1.5 cm hypoechoic lesion in the portacaval region in relation to the head of the pancreas, may be a portocaval lymph node or a primary pancreatic lesion.     MRI suggested for further evaluation     Abdominal aortic aneurysm with mid abdominal aorta measuring 4.3 cm, mildly larger from the previous study     Consider CT chest,  abdomen pelvis to evaluate for potential primary

## 2025-05-30 NOTE — H&P (VIEW-ONLY)
Name: Marixa Allen      : 1975      MRN: [id number]  Encounter Provider: PIA Hickey  Encounter Date: 2025   Encounter department: [facility]  :  Assessment & Plan  Carcinoma metastatic to liver with unknown primary site (HCC)  We have thoroughly reviewed her imaging, and she is aware that she has what appears to be stage IV cancer of an unknown primary.  We have candidly discussed the nature of her disease, which is likely treatable but not curable, and the consequences if she chooses to forego any treatment.  She understands that the nature of her treatment will depend on the primary site of disease, whether this is breast, GI, or gynecologic, and would consist of chemotherapy and/or immunotherapy.  She also expresses understanding that there is no role for surgical resection at this time. IR biopsy of the liver is already scheduled for next week.  She is aware that it may take several weeks to get final results, and that it is possible that the IR biopsy may not yield enough tissue for a definitive diagnosis.  Should she require laparoscopic biopsy, I will arrange for her to be seen by one of our surgeons expeditiously. I will refer her for urgent genetic testing at this time, and have also provided a referral for Social Work. She is currently scheduled for consultation with Dr Conn on .  I will help coordinate IR port placement closer to home closely following this appointment.  I have discussed the importance of starting treatment as soon as possible. I have also briefly reviewed the purpose of a port and how it is placed, although she states she is already familiar with them. She has been encouraged to reach out to me with any concerns or questions.    Orders:  •  Ambulatory Referral to Interventional Radiology; Future  •  Ambulatory Referral to Social Work Care Management Program; Future  •  Ambulatory Referral to Genetics;  Future    Family history of pancreatic cancer    Orders:  •  Ambulatory Referral to Genetics; Future    Personal history of breast cancer    Orders:  •  Ambulatory Referral to Genetics; Future    Abdominal aneurysm (HCC)  The patient is aware of this finding on CT scan, and has seen vascular surgery in the past.            History of Present Illness   Chief Complaint   Patient presents with   • Consult     Return with new questions or concerns.    Pertinent Medical History   This is a very pleasant 48 y/o female who presents to the office today in consultation for newly discovered stage IV cancer.  She is accompanied by her brother, and is seen today with the primary intent of providing information and answering questions.  She states she was recently found to have elevated liver enzymes on routine bloodwork. RUQ US was subsequently performed on May 20, which revealed multiple liver lesions, the largest measuring 4.4cm in greatest dimension. In addition, there was also a 2.5cm hypoechoic lesion present adjacent to the head of the pancreas, questionable lymph node versus exophytic pancreatic lesion.  She was then sent for CT of the chest, abdomen and pelvis, which was performed 2 days later. This demonstrated innumerable hepatic lesions consistent with metastatic disease, as well extensive peritoneal carcinomatosis, with the largest implant measuring 6.5cm. In addition to a previously identified infrarenal AAA, there also appears to be occlusion of the right superficial femoral artery.  There did not appear to be any obvious primary tumor on imaging. The patient states she was very surprised by the findings. She reports that other than some generalized fatigue, she is not experiencing any symptoms.  She reports chronic diarrhea for many years. She denies N/V, bowel changes, bloody or dark stools, or abdominal pain, although she did have a brief episode of RUQ discomfort that she attributed to gas pain.  She reports  her appetite is great and she has not lost any weight.  She denies headaches, dizziness, SOB or cough, night sweats, fever or chills.  She does report low pelvic pain and pressure 2 years ago and was referred for pelvic floor therapy, but denies any currently gyn or pelvic complaints.  She believes she was HPV+ on a recent pap, but was then told everything was fine.  She has a history of a [id number] right breast IDC, ER/CT strongly positive, Her2 negative, treated in  with breast conservation therapy and radiation.  She states she was told chemotherapy was not necessary, and she was only able to tolerate about 1 and a 1/2 years of endocrine therapy (Tamoxifen).  Recent screening mammogram was unremarkable and she denies any breast complaints.   The patient did have genetic testing performed in , but this was only for BRCA1/2 and was negative.  She reports that her father had pancreatic cancer in his 60's, but  from recurrent brain cancer. Her mother had melanoma.  Both of her grandfathers  very young.  She is not aware of any breast, ovarian or colon cancer in her family.  The patient is aware that she is scheduled for IR biopsy of the liver next week, and is also aware that she has been referred to Palliative Care, which she is agreeable to.  She is currently scheduled to see Medical Oncology on .        Oncology History   Cancer Staging   No matching staging information was found for the patient.  Oncology History   Personal history of breast cancer   2014 Biopsy    Right breast, 10:00 9cmfn  Invasive ductal carcinoma  Grade 1-2  , PR90, Her2 0 (neg)     2014 Surgery    Right breast lumpectomy with sentinel lymph node biopsy  0/ LN  1.8cm tumor     2014 -  Cancer Staged    [id number]         2025 Initial Diagnosis    Personal history of breast cancer          Review of Systems A complete review of systems is negative other than that noted above in the HPI.          "  Objective   /60   Pulse 59   Temp (!) 97.1 °F (36.2 °C)   Ht 5' 5\" (1.651 m)   Wt 64 kg (141 lb)   SpO2 99%   BMI 23.46 kg/m²     Pain Screening:  Pain Score: 0-No pain  ECOG    Physical Exam  Vitals reviewed.   Constitutional:       General: She is not in acute distress.     Appearance: Normal appearance. She is normal weight. She is not ill-appearing or toxic-appearing.   HENT:      Head: Normocephalic and atraumatic.     Eyes:      General: No scleral icterus.      Cardiovascular:      Rate and Rhythm: Normal rate.   Pulmonary:      Effort: Pulmonary effort is normal.     Musculoskeletal:         General: Normal range of motion.      Cervical back: Normal range of motion and neck supple.     Skin:     General: Skin is warm and dry.      Coloration: Skin is not jaundiced.     Neurological:      General: No focal deficit present.      Mental Status: She is alert and oriented to person, place, and time.     Psychiatric:         Mood and Affect: Mood normal.         Behavior: Behavior normal.         Thought Content: Thought content normal.         Judgment: Judgment normal.            Radiology Results Review: I have reviewed radiology reports from May 2025 including: CT chest, CT abdomen/pelvis, and Ultrasound(s).    CT chest abdomen pelvis w contrast  5/22/2025    Narrative & Impression   CT CHEST, ABDOMEN AND PELVIS WITH IV CONTRAST     INDICATION: K76.9: Liver disease, unspecified.     COMPARISON: CT abdomen pelvis 3/12/2023. Right upper quadrant ultrasound 5/20/2025.     TECHNIQUE: CT examination of the chest, abdomen and pelvis was performed. Multiplanar 2D reformatted images were created from the source data.     This examination, like all CT scans performed in the [facility], was performed utilizing techniques to minimize radiation dose exposure, including the use of iterative reconstruction and automated exposure control. Radiation dose length   product (DLP) for this visit: " 579 mGy-cm.     IV Contrast: 100 mL of iohexol (OMNIPAQUE)  Enteric Contrast: Not administered.     FINDINGS:     CHEST     LUNGS: Patent central airways. Emphysematous changes. Mild mosaic attenuation of the lung parenchyma.     PLEURA: Unremarkable.     HEART/GREAT VESSELS: Heart is normal in size. Coronary artery calcifications. No thoracic aortic aneurysm.     MEDIASTINUM AND STANLEY: Right cardiophrenic lymph node measures up to 1 cm in short axis dimension (2/79).     CHEST WALL AND LOWER NECK: Right breast surgical clips.     ABDOMEN     LIVER/BILIARY TREE: Innumerable hypodense lesions compatible with metastatic disease. The largest in the right hepatic lobe measures up to 2.7 cm. Mild hepatomegaly.     GALLBLADDER: No calcified gallstones. No pericholecystic inflammatory change.     SPLEEN: Unremarkable.     PANCREAS: Unremarkable.     ADRENAL GLANDS: Unremarkable.     KIDNEYS/URETERS: Unremarkable. No hydronephrosis.     STOMACH AND BOWEL: Colonic diverticulosis without findings of acute diverticulitis.     APPENDIX: No findings to suggest appendicitis.     ABDOMINOPELVIC CAVITY: No ascites. No pneumoperitoneum. Peritoneal carcinomatosis, the largest implant measures 6.5 x 2.5 cm (2/167).     VESSELS: Atherosclerosis. Infrarenal abdominal aortic saccular aneurysm measuring up to 4.5 cm in anteroposterior diameter, previously 4.0 cm when measured at the same level in 2023. Focal ectasia of the aorta more inferiorly up to 3.1 cm (2/142).   Apparent occlusion of the right superficial femoral artery (2/235).     PELVIS     REPRODUCTIVE ORGANS: Unremarkable for patient's age.     URINARY BLADDER: Unremarkable.     ABDOMINAL WALL/INGUINAL REGIONS: Unremarkable.     BONES: No acute fracture or suspicious osseous lesion.     IMPRESSION:     Innumerable hepatic lesions compatible with metastatic disease. Peritoneal carcinomatosis. Correlation with tissue sampling is recommended.     Infrarenal abdominal aortic  saccular aneurysm measuring up to 4.5 cm in anteroposterior diameter, previously 4 cm in 2023. Vascular surgery consultation is recommended.     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          US right upper quadrant  5/20/2025    Narrative & Impression   RIGHT UPPER QUADRANT ULTRASOUND     INDICATION: R74.8: Abnormal levels of other serum enzymes.     COMPARISON: None     TECHNIQUE: Real-time ultrasound of the right upper quadrant was performed with a curvilinear transducer with both volumetric sweeps and still imaging techniques.     FINDINGS:     PANCREAS: Visualized portions of the pancreas are within normal limits.  A hypoechoic lesion seen in the portacaval region in relation to the head of, measuring 1.7 x 2.5 x 1.5 cm, this may be a portocaval lymph node or a primary pancreatic lesion  AORTA AND IVC: Visualized portions are normal for patient age.     LIVER:  Size: Enlarged the liver measures 18.8 cm in the midclavicular line.  Contour: Surface contour is smooth.  Parenchyma: Echogenicity and echotexture are within normal limits.  Multiple hepatic lesions with target appearance  There is a 3.3 x 3.7 x 2.5 cm left hepatic lobe lesion  A 4.4 x 3.9 x 2.3 cm left hepatic lobe lesion and additional 2.6 x 3.4 x 4.2 cm right hepatic lobe lesion  Limited imaging of the main portal vein shows it to be patent and hepatopetal.     BILIARY:  Phrygian cap with echogenic foci with comet tail artifact, suggesting adenomyomatosis. Additional 4 mm nonmobile focus in the fundus of the gallbladder in the phrygian cap suggest adherent calcification  No intrahepatic biliary dilatation.  CBD measures 8.0 mm.  No choledocholithiasis.     KIDNEY:  Right kidney measures 11.8 x 4.2 x 5.1 cm. Volume 134.3 mL  Kidney within normal limits.     ASCITES: None.  The visualized abdominal aorta is aneurysmal with mid aorta measuring 4.3 cm, previously on the CT it is measured at 3.9 cm  IMPRESSION:  Impression:  Multiple  hepatic lesions with target appearance, suggest metastatic disease.     A 1.7 x 2.5 x 1.5 cm hypoechoic lesion in the portacaval region in relation to the head of the pancreas, may be a portocaval lymph node or a primary pancreatic lesion.     MRI suggested for further evaluation     Abdominal aortic aneurysm with mid abdominal aorta measuring 4.3 cm, mildly larger from the previous study     Consider CT chest,  abdomen pelvis to evaluate for potential primary

## 2025-06-02 ENCOUNTER — RESULTS FOLLOW-UP (OUTPATIENT)
Dept: FAMILY MEDICINE CLINIC | Facility: CLINIC | Age: 50
End: 2025-06-02

## 2025-06-03 ENCOUNTER — DOCUMENTATION (OUTPATIENT)
Dept: GENETICS | Facility: CLINIC | Age: 50
End: 2025-06-03

## 2025-06-03 ENCOUNTER — PATIENT OUTREACH (OUTPATIENT)
Dept: CASE MANAGEMENT | Facility: OTHER | Age: 50
End: 2025-06-03

## 2025-06-03 ENCOUNTER — PATIENT OUTREACH (OUTPATIENT)
Dept: CASE MANAGEMENT | Facility: HOSPITAL | Age: 50
End: 2025-06-03

## 2025-06-03 DIAGNOSIS — C80.1 CARCINOMA METASTATIC TO LIVER WITH UNKNOWN PRIMARY SITE (HCC): Primary | ICD-10-CM

## 2025-06-03 DIAGNOSIS — C78.7 CARCINOMA METASTATIC TO LIVER WITH UNKNOWN PRIMARY SITE (HCC): Primary | ICD-10-CM

## 2025-06-03 NOTE — PROGRESS NOTES
OP SWCM received referral regarding patient with stage IV cancer.  Chart reviewed and IB message sent to Oncology SW to review and advise if they will provide support to patient.  Received return IB message that Oncology SW will follow patient to provide support as needed and SWCM will not need to remain involved at this time.  Will close referral and remain available to provide support.

## 2025-06-04 ENCOUNTER — HOSPITAL ENCOUNTER (OUTPATIENT)
Dept: RADIOLOGY | Facility: HOSPITAL | Age: 50
Discharge: HOME | End: 2025-06-04
Attending: RADIOLOGY
Payer: COMMERCIAL

## 2025-06-04 ENCOUNTER — PATIENT MESSAGE (OUTPATIENT)
Dept: FAMILY MEDICINE CLINIC | Facility: CLINIC | Age: 50
End: 2025-06-04

## 2025-06-04 VITALS
HEART RATE: 56 BPM | RESPIRATION RATE: 17 BRPM | BODY MASS INDEX: 23.49 KG/M2 | HEIGHT: 65 IN | WEIGHT: 141 LBS | DIASTOLIC BLOOD PRESSURE: 68 MMHG | SYSTOLIC BLOOD PRESSURE: 107 MMHG | TEMPERATURE: 97.5 F | OXYGEN SATURATION: 98 %

## 2025-06-04 DIAGNOSIS — Z85.3 PERSONAL HISTORY OF BREAST CANCER: ICD-10-CM

## 2025-06-04 LAB
INR PPP: 0.97 (ref 0.85–1.19)
PROTHROMBIN TIME: 13.1 SECONDS (ref 12.3–15)

## 2025-06-04 PROCEDURE — 88313 SPECIAL STAINS GROUP 2: CPT | Performed by: PATHOLOGY

## 2025-06-04 PROCEDURE — 88360 TUMOR IMMUNOHISTOCHEM/MANUAL: CPT | Performed by: PATHOLOGY

## 2025-06-04 PROCEDURE — 47000 NEEDLE BIOPSY OF LIVER PERQ: CPT | Performed by: INTERNAL MEDICINE

## 2025-06-04 PROCEDURE — 88341 IMHCHEM/IMCYTCHM EA ADD ANTB: CPT | Performed by: PATHOLOGY

## 2025-06-04 PROCEDURE — 88342 IMHCHEM/IMCYTCHM 1ST ANTB: CPT | Performed by: PATHOLOGY

## 2025-06-04 PROCEDURE — 85610 PROTHROMBIN TIME: CPT | Performed by: RADIOLOGY

## 2025-06-04 PROCEDURE — 76942 ECHO GUIDE FOR BIOPSY: CPT

## 2025-06-04 PROCEDURE — 76942 ECHO GUIDE FOR BIOPSY: CPT | Performed by: INTERNAL MEDICINE

## 2025-06-04 PROCEDURE — 47000 NEEDLE BIOPSY OF LIVER PERQ: CPT

## 2025-06-04 PROCEDURE — 99152 MOD SED SAME PHYS/QHP 5/>YRS: CPT | Performed by: INTERNAL MEDICINE

## 2025-06-04 PROCEDURE — 88307 TISSUE EXAM BY PATHOLOGIST: CPT | Performed by: PATHOLOGY

## 2025-06-04 RX ORDER — FENTANYL CITRATE 50 UG/ML
INJECTION, SOLUTION INTRAMUSCULAR; INTRAVENOUS AS NEEDED
Status: COMPLETED | OUTPATIENT
Start: 2025-06-04 | End: 2025-06-04

## 2025-06-04 RX ORDER — MIDAZOLAM HYDROCHLORIDE 2 MG/2ML
INJECTION, SOLUTION INTRAMUSCULAR; INTRAVENOUS AS NEEDED
Status: COMPLETED | OUTPATIENT
Start: 2025-06-04 | End: 2025-06-04

## 2025-06-04 RX ORDER — SODIUM CHLORIDE 9 MG/ML
75 INJECTION, SOLUTION INTRAVENOUS CONTINUOUS
Status: DISCONTINUED | OUTPATIENT
Start: 2025-06-04 | End: 2025-06-05 | Stop reason: HOSPADM

## 2025-06-04 RX ORDER — LIDOCAINE WITH 8.4% SOD BICARB 0.9%(10ML)
SYRINGE (ML) INJECTION AS NEEDED
Status: COMPLETED | OUTPATIENT
Start: 2025-06-04 | End: 2025-06-04

## 2025-06-04 RX ORDER — OXYCODONE HYDROCHLORIDE 5 MG/1
5 TABLET ORAL EVERY 4 HOURS PRN
Refills: 0 | Status: DISCONTINUED | OUTPATIENT
Start: 2025-06-04 | End: 2025-06-05 | Stop reason: HOSPADM

## 2025-06-04 RX ADMIN — MIDAZOLAM 1 MG: 1 INJECTION INTRAMUSCULAR; INTRAVENOUS at 10:28

## 2025-06-04 RX ADMIN — Medication 10 ML: at 10:26

## 2025-06-04 RX ADMIN — FENTANYL CITRATE 50 MCG: 50 INJECTION INTRAMUSCULAR; INTRAVENOUS at 10:22

## 2025-06-04 RX ADMIN — MIDAZOLAM 1 MG: 1 INJECTION INTRAMUSCULAR; INTRAVENOUS at 10:22

## 2025-06-04 RX ADMIN — SODIUM CHLORIDE 75 ML/HR: 0.9 INJECTION, SOLUTION INTRAVENOUS at 09:13

## 2025-06-04 RX ADMIN — FENTANYL CITRATE 50 MCG: 50 INJECTION INTRAMUSCULAR; INTRAVENOUS at 10:28

## 2025-06-04 NOTE — TELEPHONE ENCOUNTER
Form completed.     Faxed to the number on page 1 of the form. Scanned into this encounter.    Placed in  folder on side 2.    Sent patient MYC message for  if she wishes.    Last login:  6/4/25

## 2025-06-04 NOTE — PATIENT COMMUNICATION
Patient called stating that she will be by to  paperwork as soon as she can, however she got a liver biopsy done today and is unsure when she will be ok to drive. Asked if paperwork could please be faxed to [facility] for her Fax [phone].

## 2025-06-04 NOTE — SEDATION DOCUMENTATION
Interventional Radiology Procedure Nursing Note    Procedure: Liver biopsy  Performing Provider: Dr. Dodd    Access site: RUQ  Closure: Destat and bandaid   Bedrest start time: 1040.    Medications administered:  Midazolam IV: 2 mg  Fentanyl IV: 100 mcg    Events:  [facility] Patient tolerated procedure well. Report called to [facility] RN and transported to designated department.

## 2025-06-04 NOTE — PATIENT COMMUNICATION
Spoke with patient- informed her that [facility] paperwork was successfully faxed to [facility]- confirmation received. Also let her know that she has a hard copy in her MyChart for her personal records. She gave many, many thanks!

## 2025-06-04 NOTE — DISCHARGE INSTRUCTIONS
Moderate Sedation   WHAT YOU NEED TO KNOW:   Moderate sedation, or conscious sedation, is medicine used during procedures to help you feel relaxed and calm. You will be awake and able to follow directions without anxiety or pain. You will remember little to none of the procedure. You may feel tired, weak, or unsteady on your feet after you get sedation. You may also have trouble concentrating or short-term memory loss. These symptoms should go away in 24 hours or less.   DISCHARGE INSTRUCTIONS:   Call 911 or have someone else call for any of the following:   You have sudden trouble breathing.     You cannot be woken.  Seek care immediately if:   You have a severe headache or dizziness.     Your heart is beating faster than usual.  Contact your healthcare provider if:   You have a fever.     You have nausea or are vomiting for more than 8 hours after the procedure.      Your skin is itchy, swollen, or you have a rash.     You have questions or concerns about your condition or care.  Self-care:   Have someone stay with you for 24 hours. This person can drive you to errands and help you do things around the house. This person can also watch for problems.      Rest and do quiet activities for 24 hours. Do not exercise, ride a bike, or play sports. Stand up slowly to prevent dizziness and falls. Take short walks around the house with another person. Slowly return to your usual activities the next day.      Do not drive or use dangerous machines or tools for 24 hours. You may injure yourself or others. Examples include a lawnmower, saw, or drill. Do not return to work for 24 hours if you use dangerous machines or tools for work.      Do not make important decisions for 24 hours. For example, do not sign important papers or invest money.      Drink liquids as directed. Liquids help flush the sedation medicine out of your body. Ask how much liquid to drink each day and which liquids are best for you.      Eat small,  frequent meals to prevent nausea and vomiting. Start with clear liquids such as juice or broth. If you do not vomit after clear liquids, you can eat your usual foods.      Do not drink alcohol or take medicines that make you drowsy. This includes medicines that help you sleep and anxiety medicines. Ask your healthcare provider if it is safe for you to take pain medicine.  Follow up with your healthcare provider as directed: Write down your questions so you remember to ask them during your visits.   © 2017 [employer] Information is for End User's use only and may not be sold, redistributed or otherwise used for commercial purposes. All illustrations and images included in CareNotes® are the copyrighted property of [employer]. or [employer].  The above information is an  only. It is not intended as medical advice for individual conditions or treatments. Talk to your doctor, nurse or pharmacist before following any medical regimen to see if it is safe and effective for you.       Liver biopsy    The Basics  Written by the doctors and editors at [facility]  What is a liver biopsy?  This is a procedure that checks an abnormal area of the liver. The liver is a big organ in the upper right side of the belly (figure 1).  Your doctor might do a liver biopsy to diagnose a problem, monitor a known problem, or learn more about your liver. Some common reasons for a liver biopsy include:  ? To check for certain liver diseases such as hepatitis  ? To check for fibrosis, which is thickening or scarring of the liver  ? If you have unexplained illness, liver disease, or abnormal liver blood tests  ? To learn more about a growth or tumor  ? To check the liver after a liver transplant  How do I prepare for a liver biopsy?  The doctor or nurse will tell you if you need to do anything special to prepare. Before your procedure, your doctor will do an exam. They might send you to get tests,  "such as:  ? Lab tests to check how well your blood clots  ? Ultrasound of the liver and gallbladder - An ultrasound is an imaging test that lets doctors see inside the body.  Your doctor will also ask you about your \"health history.\" This involves asking you questions about any health problems you have or had in the past, past surgeries, and any medicines you take. Tell them about:  ? Any medicines you are taking - This includes any prescription or \"over-the-counter\" medicines you use, plus any herbal supplements you take. It helps to write down and bring a list of any medicines you take, or bring a bag with all of your medicines with you.  ? Any allergies you have  ? Any bleeding problems you have - Certain medicines, including some herbs and supplements, can increase the risk of bleeding. Some health conditions also increase this risk.  You will also get information about:  ? If you need to stop taking any medicines before your procedure. In some cases, the doctor will have you stop taking certain medicines for a time before the biopsy. Follow your doctor's instructions carefully to know which medicines are safe to take and which you should avoid. Examples of medicines you might need to stop include:  ? Aspirin or aspirin-containing medicines  ? Other nonsteroidal antiinflammatory drugs (\"NSAIDs\") such as ibuprofen (sample brand names: Motrin, Advil) or naproxen (sample brand name: Aleve). Many non-prescription medicines contain NSAIDs, so check the labels or ask your pharmacist for help.  ? Medicines used to prevent blood clots, such as warfarin (brand name: Jantoven)  ? Certain medicines for heart conditions, such as clopidogrel (brand name: Plavix)  ? Certain supplements, such as fish oil or ginkgo biloba  ? Eating and drinking before your procedure - In some cases, you might need to \"fast\" before the biopsy. This means not eating or drinking anything for a period of time.  In some cases, the doctor will ask " "you to have a light breakfast such as tea or coffee and toast. They might also ask you to eat a small amount of fat (such as butter or margarine) with breakfast. The fat helps empty your gallbladder. The gallbladder is a small organ that is tucked under your liver. If it is empty, this lowers the risk that it will be injured during the biopsy.  ? What help you will need when you go home - For example, you might need to have someone else bring you home or stay with you for some time while you recover.  Ask the doctor or nurse if you have questions or if there is anything you do not understand.  What happens during a liver biopsy?  This depends on the type of biopsy you have. The choice is based on:  ? Your health  ? The size and location of the abnormal area  ? What the abnormal area looks like on ultrasound or other imaging test  When it is time for the procedure:  ? You might get an \"IV,\" which is a thin tube that goes into a vein. This can be used to give you fluids and medicines.  ? The doctors and nurses will monitor your breathing, blood pressure, and heart rate during the procedure.  ? You will get an injection (shot) to numb the area. You might also get other medicines to help you relax and be more comfortable.  ? The 3 main types of biopsies are:  ? Fine-needle biopsy - The doctor cleans and numbs the skin on the upper right side of your belly below the ribs. They might use an ultrasound to guide them as they insert a needle to do the biopsy. They use the needle to remove a small amount of tissue from the abnormal area of the liver. Then, they remove the needle and hold pressure to stop any bleeding.  ? Transjugular biopsy - This is most often done in people who are at a high risk of bleeding. The doctor places a thin tube called a \"catheter\" into a vein in the neck. Then, they use fluoroscopy (moving X-rays) to push the catheter to a vein that drains blood from the liver. The doctor inserts a special needle " through the catheter to take tissue samples of the liver. Then, they remove the needle and catheter and hold pressure to stop any bleeding.  ? Surgical biopsy - This is done in an operating room under general anesthesia. General anesthesia makes you unconscious so you can't feel, see, or hear anything during the procedure. The doctor makes a cut (incision) in your belly and takes a sample of the abnormal tissue from the liver. Then, they close the incision.  ? You might feel pressure when the doctor takes the biopsy, but it should not be painful.  ? The doctor will cover the area with clean bandages.  What happens after a liver biopsy?  After your procedure, you will be taken to a recovery room. The staff will watch you closely as your anesthesia wears off. You might need to lie on your right side for 2 to 4 hours after the biopsy. Most people can go home later the same day.  Usually, your biopsy results are available within a few days to a week.  What are the risks of a liver biopsy?  Your doctor will talk to you about all of the possible risks, and answer your questions. Possible risks include:  ? Pain  ? Bleeding  ? Infection  ? Injury to nearby organs  All topics are updated as new evidence becomes available and our peer review process is complete.  This topic retrieved from [employer] on: Mar 03, 2025.  Topic 174227 Version 2.0  Release: 33.1.2 - C33.61  © 2025 UpToDate, Inc. and/or its affiliates. All rights reserved.  figure 1: Organs inside the abdomen (belly)    Consumer Information Use and Disclaimer  Disclaimer: This generalized information is a limited summary of diagnosis, treatment, and/or medication information. It is not meant to be comprehensive and should be used as a tool to help the user understand and/or assess potential diagnostic and treatment options. It does NOT include all information about conditions, treatments, medications, side effects, or risks that may apply to a specific patient. It is  not intended to be medical advice or a substitute for the medical advice, diagnosis, or treatment of a health care provider based on the health care provider's examination and assessment of a patient's specific and unique circumstances. Patients must speak with a health care provider for complete information about their health, medical questions, and treatment options, including any risks or benefits regarding use of medications. This information does not endorse any treatments or medications as safe, effective, or approved for treating a specific patient. UpToDate, Inc. and its affiliates disclaim any warranty or liability relating to this information or the use thereof.The use of this information is governed by the Terms of Use, available at https://www.[employer] Mobileu[employer].com/en/know/clinical-effectiveness-terms. 2025© UpToDate, Inc. and its affiliates and/or licensors. All rights reserved.  © 2025 UpToDate, Inc. and/or its affiliates. All rights reserved.

## 2025-06-04 NOTE — BRIEF OP NOTE (RAD/CATH)
IR BIOPSY LIVER MASS Procedure Note    PATIENT NAME: Marixa Allen  : 1975  MRN: [id number]    Pre-op Diagnosis:   1. Personal history of breast cancer      Post-op Diagnosis:   1. Personal history of breast cancer        Surgeon:   Ramesh Dodd MD    Assistants:   Boyd Bermudez MD        Estimated Blood Loss: 15cc    Findings: Ultrasound guided biopsy of left liver mass with 5 passes performed     Specimens: 5 tissue samples were obtained from left liver mass and placed in formulin for subsequent pathologic evaluation    Complications:  None    Anesthesia: conscious sedation    Boyd Bermudez MD     Date: 2025  Time: 10:51 AM

## 2025-06-04 NOTE — INTERVAL H&P NOTE
"Update: (This section must be completed if the H&P was completed greater than 24 hrs to procedure or admission)    H&P reviewed. After examining the patient, I find no changed to the H&P since it had been written.    /65   Pulse 59   Temp 98.3 °F (36.8 °C) (Oral)   Resp 17   Ht 5' 5\" (1.651 m)   Wt 64 kg (141 lb)   SpO2 95%   BMI 23.46 kg/m²     Patient re-evaluated. Accept as history and physical.    We will proceed with biopsy of a liver mass today.    Ramesh Dodd MD/June 4, 2025/10:51 AM  "

## 2025-06-06 ENCOUNTER — TELEPHONE (OUTPATIENT)
Age: 50
End: 2025-06-06

## 2025-06-06 ENCOUNTER — NURSE TRIAGE (OUTPATIENT)
Age: 50
End: 2025-06-06

## 2025-06-06 ENCOUNTER — DOCUMENTATION (OUTPATIENT)
Dept: HEMATOLOGY ONCOLOGY | Facility: CLINIC | Age: 50
End: 2025-06-06

## 2025-06-06 DIAGNOSIS — L29.9 PRURITUS: Primary | ICD-10-CM

## 2025-06-06 RX ORDER — HYDROXYZINE HYDROCHLORIDE 25 MG/1
25 TABLET, FILM COATED ORAL EVERY 6 HOURS PRN
Qty: 30 TABLET | Refills: 1 | Status: SHIPPED | OUTPATIENT
Start: 2025-06-06

## 2025-06-06 NOTE — PROGRESS NOTES
Call to IR to check in on sched for port, the pt told  that she wants to wait until she has her biopsy and sees the oncologist first before she schedules for this.

## 2025-06-06 NOTE — TELEPHONE ENCOUNTER
"REASON FOR CONVERSATION: Uncontrollable Itching and Itching    SYMPTOMS: Overall generalized itching that makes it difficult for pt to sleep    OTHER HEALTH INFORMATION: Pt states she's only tried taking Tylenol PM to help her sleep last night which in total had 50mg of Benadryl.  Pt had liver biopsy on Wednesday.  Pt states she had an episodes of itchiness a few weeks ago that seemed to resolve on its own.  States this episode seems to be the same in intensity and not worse.      PROTOCOL DISPOSITION: Discuss with Provider and Call Back Patient (overriding See Within 3 Days in Office)    CARE ADVICE PROVIDED: Advised pt to go to .  Pt declined and wants to see if NIVIA Hsu can review and provide a treatment.  Preferred pharmacy pended.    Pt advised to take Zyrtec.  Advised pt of topical Benadryl or Calamine lotion.  Also advised use of cool pack on worst areas to help with itching but not to keep on longer than 20min at a time.      PRACTICE FOLLOW-UP: Please forward to [facility] and call pt back with provider recommendations.            Reason for Disposition   Widespread itching and cause unknown and present > 48 hours    Answer Assessment - Initial Assessment Questions  1. DESCRIPTION: \"Describe the itching you are having.\"      Generalized itching all over.    2. SEVERITY: \"How bad is it?\"       All over, no hives    3. SCRATCHING: \"Are there any scratch marks? Bleeding?\"      Denies    4. ONSET: \"When did this begin?\"       This episode started yesterday.  Previous episode was a few weeks ago.    5. CAUSE: \"What do you think is causing the itching?\" (ask about swimming pools, pollen, animals, soaps, etc.)      Unsure    6. OTHER SYMPTOMS: \"Do you have any other symptoms?\"       Denies    Protocols used: Itching - Widespread-Adult-[place]    "

## 2025-06-06 NOTE — TELEPHONE ENCOUNTER
Had biopsy on Wednesday, feels good doesn't have discomfort. But last night she had uncontrollable itching. Patient requested to speak to Zhane Rodriguez directly to see if she can take anything to help with itching and discomfort.     Please call patient.       Thanks!

## 2025-06-06 NOTE — TELEPHONE ENCOUNTER
Called patient regarding her itching.  She reports it started yesterday and covers her whole body.  She denies any dark urine or yellowing of the eyes or skin.  She may be experiencing an allergic reaction to either the Versed or fentanyl given during the liver biopsy.  Her PCP has called in hydroxyzine.  If that does not help, she should either go to Urgent Care or call our on-call physician for a steroid dose pack. Pt thankful for the call.

## 2025-06-06 NOTE — TELEPHONE ENCOUNTER
Patient had a biopsy of her liver yesterday.  She has since had diarrhea and uncontrollable itching all over her body.  She has the diarrhea under control but not the itch.  She has taken benadryl and it's not helping.  Can the doctor please recommend something that might help?  Please advise.  Thank you.

## 2025-06-09 ENCOUNTER — TELEPHONE (OUTPATIENT)
Dept: SURGICAL ONCOLOGY | Facility: CLINIC | Age: 50
End: 2025-06-09

## 2025-06-09 ENCOUNTER — OFFICE VISIT (OUTPATIENT)
Dept: GENETICS | Facility: CLINIC | Age: 50
End: 2025-06-09

## 2025-06-09 DIAGNOSIS — R16.0 LIVER MASS: Primary | ICD-10-CM

## 2025-06-09 DIAGNOSIS — C80.1 CARCINOMA METASTATIC TO LIVER WITH UNKNOWN PRIMARY SITE (HCC): ICD-10-CM

## 2025-06-09 DIAGNOSIS — Z80.0 FAMILY HISTORY OF PANCREATIC CANCER: ICD-10-CM

## 2025-06-09 DIAGNOSIS — C50.919 STAGE IV BREAST CANCER IN FEMALE (HCC): Primary | ICD-10-CM

## 2025-06-09 DIAGNOSIS — C78.7 CARCINOMA METASTATIC TO LIVER WITH UNKNOWN PRIMARY SITE (HCC): ICD-10-CM

## 2025-06-09 DIAGNOSIS — Z84.89 FAMILY HISTORY OF BRAIN TUMOR: ICD-10-CM

## 2025-06-09 DIAGNOSIS — Z85.3 PERSONAL HISTORY OF BREAST CANCER: Primary | ICD-10-CM

## 2025-06-09 PROCEDURE — 88341 IMHCHEM/IMCYTCHM EA ADD ANTB: CPT | Performed by: PATHOLOGY

## 2025-06-09 PROCEDURE — 88313 SPECIAL STAINS GROUP 2: CPT | Performed by: PATHOLOGY

## 2025-06-09 PROCEDURE — 88307 TISSUE EXAM BY PATHOLOGIST: CPT | Performed by: PATHOLOGY

## 2025-06-09 PROCEDURE — 88342 IMHCHEM/IMCYTCHM 1ST ANTB: CPT | Performed by: PATHOLOGY

## 2025-06-09 PROCEDURE — PBNCHG PB NO CHARGE PLACEHOLDER: Performed by: GENETIC COUNSELOR, MS

## 2025-06-09 PROCEDURE — 88360 TUMOR IMMUNOHISTOCHEM/MANUAL: CPT | Performed by: PATHOLOGY

## 2025-06-09 NOTE — PROGRESS NOTES
Pre-Test Genetic Counseling Consult Note    Patient Name: Marixa Allen   /Age: 1975/49 y.o.  Referring Provider: PIA Hickey    Date of Service: 2025  Genetic Counselor: Marie Raman [place] [facility] [place]pretation Services: None  Location: Telephone consult   Length of Visit: 60 minutes were spent on this date of service performing the following activities: preparing for visit, reviewing records, obtaining history, providing counseling and education, documenting    Marixa was referred to the [facility] Cancer Risk and Genetic Assessment Program due to her personal history of breast cancer and family history of pancreas cancer and brain tumors. she presents today to discuss the possibility of a hereditary cancer syndrome, options for genetic testing, and implications for her and her family.        Cancer History and Treatment:     2025 Liver biopsy   Final Diagnosis   A. Liver mass:  - Metastatic carcinoma, favor a mammary primary.      Comment: Immunohistochemistry was performed on block A2. The tumor cells are positive for AE1/3, GATA3, ER, CA 19.9, patchy positive for mucicarmine and negative for TTF-1, MOC31, p40, PAX8, CDX2, synaptophysin, chromogranin A, p53 (wild-type); DPC-4 and p16 shows no loss; Ki-67 shows mitotic proliferative index of approximately 20 to 30%.  Given diffuse positive for GATA3 and ER, findings are most consistent with metastatic carcinoma of mammary primary.  Clinical and imaging correlation is suggested.         Oncology History   Personal history of breast cancer   2014 Biopsy    Right breast, 10:00 9cmfn  Invasive ductal carcinoma  Grade 1-2  , PR90, Her2 0 (neg)     2014 Surgery    Right breast lumpectomy with sentinel lymph node biopsy  0/ LN  1.8cm tumor     2014 -  Cancer Staged    [id number]         2025 Initial Diagnosis    Personal history of breast cancer       Screening Hx:     Breast:  Breast Imaging: Annual (most recent  "5/20/2025)    Medical and Surgical History  Pertinent surgical history: Past Surgical History[1]   Pertinent medical history:Past Medical History[2]      Other History:  Height:   Ht Readings from Last 1 Encounters:   06/04/25 5' 5\" (1.651 m)     Weight:   Wt Readings from Last 1 Encounters:   06/04/25 64 kg (141 lb)     Relevant Family History   Patient reports no Ashkenazi Scientologist ancestry.         Please refer to the scanned pedigree in the Media Tab for a complete family history     *All history is reported as provided by the patient; records are not available for review, except where indicated.     Assessment:  We discussed sporadic, familial and hereditary cancer.  We also discussed the many factors that influence our risk for cancer such as age, environmental exposures, lifestyle choices and family history.      We reviewed the indications suggestive of a hereditary predisposition to cancer.    Genetic testing is indicated for Marixa based on the following criteria:     Meets NCCN V3.2025 Testing Criteria for High-Penetrance Breast Cancer Susceptibility Genes: Personal history of breast cancer at age 38    Meets NCCN V3.2025 Testing Criteria for Pancreatic Cancer Susceptibility Genes: Family history of pancreas cancer in a first-degree relative     The risks, benefits, and limitations of genetic testing were reviewed with the patient, as well as genetic discrimination laws, and possible test results (positive, negative, variants of uncertain significance) and their clinical implications. If positive for a mutation, options for managing cancer risk including increased surveillance, chemoprevention, and in some cases prophylactic surgery were discussed. Marixa was informed that if a hereditary cancer syndrome was identified in her, first degree relatives (parents, siblings, and children) have a chance of also inheriting the condition. Genetic testing would allow for predictive genetic testing in other " relatives, who may also be at risk depending on their degree of relation.     Billing:  Most individuals pay <$100 for hereditary cancer genetic testing. If insurance covers the cost of the testing, individuals may still pay out of pocket secondary to co-pays, co-insurance, or deductibles. If the cost of the testing exceeds $100, the lab will reach out to the patient via phone or e-mail. The patient will then have the option to proceed with the testing, cancel the testing, or elect the self-pay option of $250. Marixa verbalized understanding.     Plan: Patient decided to proceed with testing and provided consent.    Summary:     Sample Collection:  An order was placed and the patient was instructed to go to a [facility] lab for a blood collection    Genetic Testing Performed: [email or web address]-Expanded + RNA (77 genes): AIP, ALK, APC, HUBER, AXIN2, BAP1, BARD1, BMPR1A, BRCA1, BRCA2, BRIP1, CDC73, CDH1, CDK4, CDKN1B, CDKN2A, CEBPA, CHEK2, CTNNA1, DDX41, DICER1, EGFR, EPCAM, ETV6, FH, FLCN, GATA2, GREM1, HOXB13, KIT, LZTR1, MAX, MBD4, MEN1, MET, MITF, MLH1, MSH2, MSH3, MSH6, MUTYH, NF1, NF2, NTHL1, PALB2, PDGFRA, PHOX2B, PMS2, POLD1, POLE, POT1, ALPHL9N, PTCH1, PTEN, RAD51C, RAD51D, RB1, RET, RPS20, RUNX1, SDHA, SDHAF2, SDHB, SDHC, SDHD, SMAD4, SMARCA4, SMARCB1, SMARCE1, STK11, SUFU, NQHB695, TP53, TSC1, TSC2, VHL, WT1     Result Call Information:  In the event that we need to reach Marixa via telephone:  I confirmed the patient's mobile number on file as the best number to call with results  I confirmed with the patient that we can leave a voicemail on the provided numbers    Results take approximately 2-3 weeks to complete once test is started.    Marixa will be notified via [employer] once results are available.      Additional recommendations for surveillance/medical management will be made pending genetic test results.           [1]   Past Surgical History:  Procedure Laterality Date    ANTERIOR CRUCIATE LIGAMENT REPAIR       BREAST LUMPECTOMY      BREAST SURGERY       SECTION      IR BIOPSY LIVER MASS  2025    SENTINEL LYMPH NODE BIOPSY     [2]   Past Medical History:  Diagnosis Date    Abnormal Pap smear of cervix     Anxiety     Last week    Breast cancer (HCC)      (right)    Cancer (HCC)     right breast    Hypertension     Personal history of radiation therapy     2014

## 2025-06-09 NOTE — TELEPHONE ENCOUNTER
Called and reviewed with patient that the liver biopsy shows that this appears to be metastatic breast cancer from her primary disease in 2014.  We are still awaiting ER/IL and Her2 status. I have reviewed her case with Dr Conn; as chemotherapy may not be necessary, IR port should be scheduled about 2 weeks from now.  This can be cancelled if not needed. She is somewhat hesitant, and would not want this done until after June 25. I have explained that she will need a bone scan done prior to her consult with Dr Conn.  The patient expressed that she may seek a second opinion outside of [place]; she may relocate with family for treatment.  I explained that the bone scan should certainly be done prior to a 2nd opinion, as a positive bone scan would indicate a need for additional therapy.  Patient expressed understanding of everything discussed.  I encouraged her to call back with any additional questions or concerns.

## 2025-06-10 ENCOUNTER — TELEPHONE (OUTPATIENT)
Dept: SURGICAL ONCOLOGY | Facility: CLINIC | Age: 50
End: 2025-06-10

## 2025-06-10 NOTE — TELEPHONE ENCOUNTER
Called and spoke to patient, confirming bone scan scheduled 6/19/25. Patient declined and would like to have it done sooner before 6/18/25 appointment with Dr. Conn. Radiology dept. Is working on having her scheduled at this time, per PIA Hernadez.

## 2025-06-11 ENCOUNTER — PATIENT OUTREACH (OUTPATIENT)
Dept: HEMATOLOGY ONCOLOGY | Facility: CLINIC | Age: 50
End: 2025-06-11

## 2025-06-11 NOTE — PROGRESS NOTES
I reached out and spoke with Marixa ,.  She has been seen in consult by Surgical Oncology. I introduced myself and explained my role as their Patient Navigator. I reviewed for any barriers to care and offered referrals to supportive services as needed. I reviewed and updated the members assigned to the care team in [facility]. She knows the members of the care team as well as how and when to contact them with any needs.     Distress Thermometer completed at this time. Patient scored 7/10. Referral to  placed..     She is currently able to drive and denies any transportation needs.      She denies any uncontrolled symptoms. Discussed role of Palliative Care in symptom and side effect management. Declined referral at this time.    She states that she is eating and drinking as per usual with no unintentional weight loss.       Patient is currently smoking. We reviewed the smoking cessation program. I offered to place a referral but patient declines at this time.      She states she is well supported by family and friends.  Community support groups discussed including the Cancer Support [facility] of the [facility]. Patient declined information at this time.     She feels she has adequate insurance coverage and denies any financial concerns at this time.     She verbalizes managing the schedules well.   Future Appointments   Date Time Provider Department Center   6/12/2025 11:00 AM MO NM 1 MO NM MO HOSP   6/12/2025  1:30 PM MO NM 1 MO NM MO HOSP   6/18/2025  9:00 AM Marla Conn HEM ONC STR Practice-Onc   8/14/2025  7:30 AM DO DANK [facility]        Based on individual needs I will follow up in about 3 weeks. I have provided my direct contact information and welcome them to contact me if needs as discussed above change. She was appreciative for the call.   Pt stated she may be receiving treatment in [place] to have the support from her family. She will discuss this at her medical oncology  appointment

## 2025-06-12 ENCOUNTER — HOSPITAL ENCOUNTER (OUTPATIENT)
Dept: NUCLEAR MEDICINE | Facility: HOSPITAL | Age: 50
End: 2025-06-12
Payer: COMMERCIAL

## 2025-06-12 DIAGNOSIS — C50.919 STAGE IV BREAST CANCER IN FEMALE (HCC): ICD-10-CM

## 2025-06-12 PROCEDURE — A9503 TC99M MEDRONATE: HCPCS

## 2025-06-12 PROCEDURE — 78306 BONE IMAGING WHOLE BODY: CPT

## 2025-06-13 ENCOUNTER — PATIENT OUTREACH (OUTPATIENT)
Dept: CASE MANAGEMENT | Facility: HOSPITAL | Age: 50
End: 2025-06-13

## 2025-06-13 NOTE — PROGRESS NOTES
Biopsychosocial and Barriers Assessment    Type of Cancer: abdominal carcinomatosis, primary TBD  Treatment plan: TBD, consult 6/18  Noted barriers to care: limited support locally  Cultural/Advent concerns: none noted  Hair Loss/ Wig resources needed: not discussed today    DT completed: 6/11  DT score: 7  Issues noted: tobacco use    Marital status/Lives with: single, lives alone  Pt's support system: family, all live in [place]  Mental Health history: none noted  Substance Abuse: tobacco    POA/LW/HCR: not discussed today  Side affect:    Employment/income source: works FT as a [employer] , has FMLA and STD benefits  Concerns with bills (treatment vs household): unknown at this time  Noted issues with home: none noted    Narrative note:      MSW s/w pt by phone this afternoon to introduce myself and follow up on her DT, completed earlier this week.  Pt says that she's doing well today and agrees that her stress is high, but says that she's just trying to juggle all of this information and make plans while not yet having a treatment plan.  She will see Dr. Conn next week to discuss and plans to seek a second opinion in [place], where all of her family lives.  Her brother was here with her for her Surg Onc consult and her mother plans to come for the Med Onc consult next week.  I stressed to her that a second opinion is certainly understandable and we want her to make informed decisions that she is comfortable with.  She notes that she's not sure where she will do tx as all of her support system is in [place], but she's just going to take it as it comes and make those decisions when she has more information.    Pt works locally as a  for a [employer] and says that her company has been very supportive so far.  She has FMLA and STD benefits and is hoping to go out on partial STD so that she doesn't have to worry about losing her insurance.  She has been in touch with her company  "about this already and when she's able to decide where she'll do treatment, she will work with them to file.  She says that for the time being, she has accepted that \"this is what it is\" but is happy that her imaging and test results so far seem to have better results than expected.      For now, pt has no needs or concerns but was appreciative of the outreach and the time spent talking.  She took my direct number and I have encouraged her to reach out as needed moving forward.  We agreed that I will check in with her in 2-3 weeks if I do not hear from her first.  No other needs or concerns noted at this time.  "

## 2025-06-18 ENCOUNTER — APPOINTMENT (OUTPATIENT)
Dept: LAB | Facility: CLINIC | Age: 50
End: 2025-06-18
Payer: COMMERCIAL

## 2025-06-18 ENCOUNTER — TELEPHONE (OUTPATIENT)
Dept: HEMATOLOGY ONCOLOGY | Facility: CLINIC | Age: 50
End: 2025-06-18

## 2025-06-18 ENCOUNTER — CONSULT (OUTPATIENT)
Dept: HEMATOLOGY ONCOLOGY | Facility: CLINIC | Age: 50
End: 2025-06-18
Attending: FAMILY MEDICINE
Payer: COMMERCIAL

## 2025-06-18 ENCOUNTER — DOCUMENTATION (OUTPATIENT)
Dept: HEMATOLOGY ONCOLOGY | Facility: CLINIC | Age: 50
End: 2025-06-18

## 2025-06-18 VITALS
HEIGHT: 65 IN | SYSTOLIC BLOOD PRESSURE: 130 MMHG | RESPIRATION RATE: 17 BRPM | OXYGEN SATURATION: 99 % | WEIGHT: 139 LBS | BODY MASS INDEX: 23.16 KG/M2 | TEMPERATURE: 97.4 F | HEART RATE: 66 BPM | DIASTOLIC BLOOD PRESSURE: 82 MMHG

## 2025-06-18 DIAGNOSIS — K76.9 LIVER LESION: ICD-10-CM

## 2025-06-18 DIAGNOSIS — C78.7 CARCINOMA METASTATIC TO LIVER WITH UNKNOWN PRIMARY SITE (HCC): ICD-10-CM

## 2025-06-18 DIAGNOSIS — Z80.0 FAMILY HISTORY OF PANCREATIC CANCER: ICD-10-CM

## 2025-06-18 DIAGNOSIS — Z84.89 FAMILY HISTORY OF BRAIN TUMOR: ICD-10-CM

## 2025-06-18 DIAGNOSIS — Z85.3 PERSONAL HISTORY OF BREAST CANCER: ICD-10-CM

## 2025-06-18 DIAGNOSIS — C80.1 CARCINOMA METASTATIC TO LIVER WITH UNKNOWN PRIMARY SITE (HCC): ICD-10-CM

## 2025-06-18 DIAGNOSIS — C50.919 STAGE IV BREAST CANCER IN FEMALE (HCC): ICD-10-CM

## 2025-06-18 DIAGNOSIS — C76.2 ABDOMINAL CARCINOMATOSIS (HCC): ICD-10-CM

## 2025-06-18 DIAGNOSIS — C50.919 STAGE IV BREAST CANCER IN FEMALE (HCC): Primary | ICD-10-CM

## 2025-06-18 DIAGNOSIS — I71.40 ABDOMINAL ANEURYSM (HCC): ICD-10-CM

## 2025-06-18 PROBLEM — C78.6 METASTASIS TO PERITONEUM (HCC): Status: ACTIVE | Noted: 2025-06-18

## 2025-06-18 LAB
ALBUMIN SERPL BCG-MCNC: 3.9 G/DL (ref 3.5–5)
ALP SERPL-CCNC: 541 U/L (ref 34–104)
ALT SERPL W P-5'-P-CCNC: 79 U/L (ref 7–52)
ANION GAP SERPL CALCULATED.3IONS-SCNC: 8 MMOL/L (ref 4–13)
AST SERPL W P-5'-P-CCNC: 33 U/L (ref 13–39)
BASOPHILS # BLD AUTO: 0.13 THOUSANDS/ÂΜL (ref 0–0.1)
BASOPHILS NFR BLD AUTO: 1 % (ref 0–1)
BILIRUB SERPL-MCNC: 1.05 MG/DL (ref 0.2–1)
BUN SERPL-MCNC: 21 MG/DL (ref 5–25)
CALCIUM SERPL-MCNC: 9.9 MG/DL (ref 8.4–10.2)
CARIS ANDROGEN RECEPTOR: NEGATIVE
CARIS ER: POSITIVE
CARIS GENOMIC LOH - EXOME: NORMAL
CARIS HER2/NEU: NORMAL
CARIS HLA-A: NORMAL
CARIS HLA-B: NORMAL
CARIS HLA-C: NORMAL
CARIS MSI - EXOME: NORMAL
CARIS PD-L1 (22C3): NEGATIVE
CARIS PR: NEGATIVE
CARIS PTEN: POSITIVE
CARIS TMB - EXOME: NORMAL
CEA SERPL-MCNC: 45.8 NG/ML (ref 0–3)
CHLORIDE SERPL-SCNC: 101 MMOL/L (ref 96–108)
CO2 SERPL-SCNC: 26 MMOL/L (ref 21–32)
CREAT SERPL-MCNC: 0.89 MG/DL (ref 0.6–1.3)
EOSINOPHIL # BLD AUTO: 0.11 THOUSAND/ÂΜL (ref 0–0.61)
EOSINOPHIL NFR BLD AUTO: 1 % (ref 0–6)
ERYTHROCYTE [DISTWIDTH] IN BLOOD BY AUTOMATED COUNT: 12.8 % (ref 11.6–15.1)
GFR SERPL CREATININE-BSD FRML MDRD: 75 ML/MIN/1.73SQ M
GLUCOSE P FAST SERPL-MCNC: 112 MG/DL (ref 65–99)
HCT VFR BLD AUTO: 39.8 % (ref 34.8–46.1)
HGB BLD-MCNC: 13 G/DL (ref 11.5–15.4)
IMM GRANULOCYTES # BLD AUTO: 0.03 THOUSAND/UL (ref 0–0.2)
IMM GRANULOCYTES NFR BLD AUTO: 0 % (ref 0–2)
LYMPHOCYTES # BLD AUTO: 3.72 THOUSANDS/ÂΜL (ref 0.6–4.47)
LYMPHOCYTES NFR BLD AUTO: 34 % (ref 14–44)
MCH RBC QN AUTO: 31 PG (ref 26.8–34.3)
MCHC RBC AUTO-ENTMCNC: 32.7 G/DL (ref 31.4–37.4)
MCV RBC AUTO: 95 FL (ref 82–98)
MONOCYTES # BLD AUTO: 0.76 THOUSAND/ÂΜL (ref 0.17–1.22)
MONOCYTES NFR BLD AUTO: 7 % (ref 4–12)
NEUTROPHILS # BLD AUTO: 6.08 THOUSANDS/ÂΜL (ref 1.85–7.62)
NEUTS SEG NFR BLD AUTO: 57 % (ref 43–75)
NRBC BLD AUTO-RTO: 0 /100 WBCS
PLATELET # BLD AUTO: 606 THOUSANDS/UL (ref 149–390)
PMV BLD AUTO: 9.6 FL (ref 8.9–12.7)
POTASSIUM SERPL-SCNC: 4.9 MMOL/L (ref 3.5–5.3)
PROT SERPL-MCNC: 8.1 G/DL (ref 6.4–8.4)
RBC # BLD AUTO: 4.2 MILLION/UL (ref 3.81–5.12)
SODIUM SERPL-SCNC: 135 MMOL/L (ref 135–147)
URATE SERPL-MCNC: 6.7 MG/DL (ref 2–7.5)
WBC # BLD AUTO: 10.83 THOUSAND/UL (ref 4.31–10.16)

## 2025-06-18 PROCEDURE — 36415 COLL VENOUS BLD VENIPUNCTURE: CPT

## 2025-06-18 PROCEDURE — 80053 COMPREHEN METABOLIC PANEL: CPT

## 2025-06-18 PROCEDURE — 85025 COMPLETE CBC W/AUTO DIFF WBC: CPT

## 2025-06-18 PROCEDURE — 82378 CARCINOEMBRYONIC ANTIGEN: CPT

## 2025-06-18 PROCEDURE — 84550 ASSAY OF BLOOD/URIC ACID: CPT

## 2025-06-18 PROCEDURE — 99245 OFF/OP CONSLTJ NEW/EST HI 55: CPT | Performed by: INTERNAL MEDICINE

## 2025-06-18 PROCEDURE — 86300 IMMUNOASSAY TUMOR CA 15-3: CPT

## 2025-06-18 RX ORDER — OXYCODONE HYDROCHLORIDE 5 MG/1
5 TABLET ORAL EVERY 8 HOURS PRN
Qty: 20 TABLET | Refills: 0 | Status: SHIPPED | OUTPATIENT
Start: 2025-06-18

## 2025-06-18 RX ORDER — LETROZOLE 2.5 MG/1
2.5 TABLET, FILM COATED ORAL DAILY
Qty: 90 TABLET | Refills: 0 | Status: SHIPPED | OUTPATIENT
Start: 2025-06-18

## 2025-06-18 NOTE — ASSESSMENT & PLAN NOTE
Patient does follow-up with vascular surgery regarding the same.  He also seems to have blockage of superficial femoral artery.  However she is asymptomatic.  I offered to do ultrasound however she is reports that she may be traveling and may not be able to schedule it.  She has been asked to follow-up with vascular surgery.  If blood work is unremarkable in terms of any anemia can also consider use of aspirin.

## 2025-06-18 NOTE — PROGRESS NOTES
Name: Marixa Allen      : 1975      MRN: [id number]  Encounter Provider: Marla Conn  Encounter Date: 2025   Encounter department: [facility] [place]  :  Assessment & Plan  Abdominal carcinomatosis (HCC)  Currently not symptomatic except for the abdominal pain.  Will definitely benefit from a formal palliative care input and management alongside medical oncology management.  Orders:  •  Ambulatory Referral to Hematology / Oncology  •  letrozole (FEMARA) 2.5 mg tablet; Take 1 tablet (2.5 mg total) by mouth daily  •  CBC and differential; Future  •  Comprehensive metabolic panel; Future  •  ECG 12 lead; Future  •  Cancer antigen 27.29; Future  •  CEA; Future    Liver lesion  Related to metastatic disease.  Patient having significant right upper quadrant pain.  To limit Tylenol use will give some oxycodone in the interim.  Did ask her to follow-up with palliative care.  Will repeat her blood work.  Orders:  •  Ambulatory Referral to Hematology / Oncology  •  letrozole (FEMARA) 2.5 mg tablet; Take 1 tablet (2.5 mg total) by mouth daily  •  CBC and differential; Future  •  Comprehensive metabolic panel; Future  •  ECG 12 lead; Future  •  Cancer antigen 27.29; Future  •  CEA; Future    Stage IV breast cancer in female (HCC)  In summary patient is a 49-year-old postmenopausal woman with past medical history of an ER/TN positive right breast cancer status postlumpectomy radiation and about 1-1/2 to 2 years of adjuvant treatment with tamoxifen in  currently presenting with metastatic disease involving liver and the peritoneum with a negative bone scan with ER90% TN 20% HER2/timbo negative/0 breast cancer.  Patient had her genetic testing done this morning.  Caris testing is pending on the tumor tissue.    Had an extensive discussion with the patient and her mother about the diagnosis, staging, prognosis and overall management plan.  Discussion of decision making  Oncology  history updated, accordingly, during this visit  Goals of care/patient communication  I discussed with the patient the clinical course leading up to their cancer diagnosis. I reviewed relevant office notes, imaging reports and pathology result as well.  I told the patient that this is now stage IV metastatic breast cancer which is unfortunately incurable however very treatable with overall good outcomes.  . We discussed that the goal of anti-cancer therapy is to provide best quality of life, extend overall survival, and progression free survival as shown in clinical trials.  Discussion as per rEendira walker and Erendira Bates clinical trials clinical trials regarding use of Ribociclib along with aromatase inhibitor with letrozole.  As she has had no vaginal bleeding or spotting for more than 2 years she would technically be menopausal at this time.    We also discussed the role of palliative care and management and supportive care referral has been made she is yet to see them.  I explained the risks/benefits of the proposed cancer therapy: Ribociclib and letrozole and after discussion including understanding risks of possible life-threatening complications and therapy-related malignancy development, informed consent has been signed and obtained  Discussed about the monitoring needed and the expected side effects related to both aromatase inhibitor with hot flashes, vasomotor side effects, arthralgias, cardiovascular risks, risk related to bone health and monitoring needed.  And also related to the use of Ribociclib with EKG monitoring needed for side effects related to QTc prolongation, liver abnormalities, ILD, fatigue, myelosuppression with need for monitoring of blood counts and liver function tests.  I did  her that if there is any concern for compromise of her organ function something which is called visceral crisis related to her significant liver metastatic disease burden she may be also considered for  chemotherapy for initial faster response and tumor debulking.  Hence we will be sending her for some blood work today including uric acid as well.  Patient is currently deciding whether she will be staying around here or move to [place] to be closer to family.  She is already made an appointment with an oncologist there and may be seeking a second opinion.  At some point we will also send her for bone density.  She will be doing EKG monitoring before she starts Ribociclib and then 2 weeks of starting it and then after 4 weeks of starting it.  She will also be doing blood work as directed.  I will be seeing her for ongoing imaging and blood work.  . Orders:  •  letrozole (FEMARA) 2.5 mg tablet; Take 1 tablet (2.5 mg total) by mouth daily  •  CBC and differential; Future  •  Comprehensive metabolic panel; Future  •  ECG 12 lead; Future  •  Cancer antigen 27.29; Future  •  CEA; Future  •  oxyCODONE (Roxicodone) 5 immediate release tablet; Take 1 tablet (5 mg total) by mouth every 8 (eight) hours as needed for moderate pain or severe pain Max Daily Amount: 15 mg  •  Uric acid; Future    Abdominal aneurysm (HCC)  Patient does follow-up with vascular surgery regarding the same.  He also seems to have blockage of superficial femoral artery.  However she is asymptomatic.  I offered to do ultrasound however she is reports that she may be traveling and may not be able to schedule it.  She has been asked to follow-up with vascular surgery.  If blood work is unremarkable in terms of any anemia can also consider use of aspirin.         Assessment & Plan    Follow-up  The patient will follow up in 4 weeks.      Return in about 4 weeks (around 7/16/2025).    History of Present Illness   Chief Complaint   Patient presents with   • Consult     History of Present Illness  The patient presents for evaluation of breast cancer, itching, and right-sided abdominal discomfort.  Marixa is a 49-year-old female who has a history of right  breast cancer diagnosed in 2014 invasive ductal carcinoma T1 cN0 M0 ER/UT positive HER2/timbo negative received lumpectomy with sentinel lymph node biopsy and radiation therapy was given tamoxifen for adjuvant therapy however could not tolerate more than 2 years of endocrine therapy had significant cramps aches and pains on it.  Recently also had a mammogram in May 2025 which was normal.  Patient was found to have elevated liver enzymes on routine blood work.  Right upper quadrant ultrasound subsequently on May 20 revealed multiple liver lesions largest being 4.4 cm with 2.4 cm hypoechoic lesion adjacent to the head of pancreas with questionable lymph node versus exophytic pancreatic lesion.  Was sent for CT chest abdomen pelvis which demonstrated innumerable hepatic lesions consistent with metastatic disease as well as extensive peritoneal carcinomatosis largest implant measuring 6.5 cm.  Was then sent for IR guided biopsy of 1 of these liver lesions which she did on 6/4/2025.  Biopsies consistent with metastatic adenocarcinoma of breast primary ER/UT positive HER2/timbo negative.  She recently also had a bone scan which did not reveal any obvious bony metastatic disease.    She is accompanied by her mother on today's visit.  She lives close to her cancer center however most of her family is in [place].    She has been experiencing pruritus for several weeks, which intensified following a biopsy. The itching was initially localized to her abdomen and back, but it has since spread. She reports no itching today. Hydroxyzine was prescribed but provided minimal relief.    Yesterday, she experienced significant discomfort on her right side, describing it as bloated and swollen, and found it difficult to find a comfortable position. She attempted to alleviate the discomfort with Tylenol in the afternoon and Tylenol PM at night, but these measures were ineffective. She also experienced an episode of vomiting yesterday. She  has previously tried lidocaine patches for pain management without success.    She has not menstruated for approximately 9 years, which she attributes to the tamoxifen, and reports no bleeding or spotting. She had a CT scan in 2023 for pelvic pain, which did not reveal any abnormalities. She has been referred to palliative care but has not yet scheduled an appointment. She has been on family medical leave for the past month and is seeking partial disability. She is planning to seek a second opinion in [place] where her family resides. She is considering dental work and is curious if this can be done concurrently with her treatment. She has previously taken tramadol for pain management during her breast cancer treatment but discontinued due to severe night sweats and nightmares. She has also taken oxycodone without adverse effects.    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  She is aware of the aneurysm which has been monitored and has grown since her last CT scan, but not to the extent that surgery is required. She reports no pain or swelling in her leg.    She has a history of uveitis and occasionally experiences cold sores in her eyes.  Oncology History   Cancer Staging   No matching staging information was found for the patient.  Oncology History   Personal history of breast cancer   1/21/2014 Biopsy    Right breast, 10:00 9cmfn  Invasive ductal carcinoma  Grade 1-2  , PR90, Her2 0 (neg)     2/27/2014 Surgery    Right breast lumpectomy with sentinel lymph node biopsy  0/1 LN  1.8cm tumor     2/27/2014 -  Cancer Staged    [id number]         5/1/2025 Initial Diagnosis    Personal history of breast cancer     Stage IV breast cancer in female (HCC)   5/30/2025 Initial Diagnosis    Stage IV breast cancer in female (HCC)     6/4/2025 -  Cancer Staged    Staging  "form: Breast, AJCC 8th Edition  - Clinical stage from 6/4/2025: Stage IV (cTX, cNX, pM1, ER+, KY+, HER2-) - Signed by Marla Conn on 6/18/2025          Pertinent Medical History      06/18/25: Presents for further recommendations after recent diagnosis of stage IV metastatic cancer.  Accompanied by mother.  Reports significant right upper quadrant discomfort and also pruritus.  Otherwise no constipation or diarrhea.  No urinary symptoms no chest pain shortness of breath swelling in the legs or any pain in the legs.     Review of Systems   Constitutional:  Negative for chills and fever.   HENT:  Negative for ear pain and sore throat.    Eyes:  Negative for pain and visual disturbance.   Respiratory:  Negative for cough and shortness of breath.    Cardiovascular:  Negative for chest pain and palpitations.   Gastrointestinal:  Positive for abdominal pain, nausea and vomiting.   Genitourinary:  Negative for dysuria and hematuria.   Musculoskeletal:  Negative for arthralgias and back pain.   Skin:  Negative for color change and rash.   Neurological:  Negative for seizures and syncope.   All other systems reviewed and are negative.    Medical History Reviewed by provider this encounter:     .      Objective   /82 (BP Location: Right arm, Patient Position: Sitting, Cuff Size: Adult)   Pulse 66   Temp (!) 97.4 °F (36.3 °C) (Temporal)   Resp 17   Ht 5' 5\" (1.651 m)   Wt 63 kg (139 lb)   SpO2 99%   BMI 23.13 kg/m²     Pain Screening:  Pain Score:   1 (itching, comes and goes, feels uncomfortable.)  ECOG   0  Physical Exam  Constitutional:       Appearance: Normal appearance. She is normal weight.   HENT:      Head: Normocephalic.      Nose: Nose normal.      Mouth/Throat:      Mouth: Mucous membranes are moist.     Eyes:      Extraocular Movements: Extraocular movements intact.      Pupils: Pupils are equal, round, and reactive to light.       Cardiovascular:      Rate and Rhythm: Normal rate.      Pulses: Normal " pulses.   Pulmonary:      Effort: Pulmonary effort is normal.      Breath sounds: Normal breath sounds.   Abdominal:      General: Bowel sounds are normal. There is distension.      Palpations: Abdomen is soft. There is mass.      Tenderness: There is abdominal tenderness.     Musculoskeletal:         General: Normal range of motion.      Cervical back: Normal range of motion.   Lymphadenopathy:      Cervical: No cervical adenopathy.     Skin:     General: Skin is warm.     Neurological:      General: No focal deficit present.      Mental Status: She is alert and oriented to person, place, and time. Mental status is at baseline.     Labs: I have reviewed the following labs:  Lab Results   Component Value Date/Time    WBC 12.88 (H) 05/14/2025 09:15 AM    RBC 4.60 05/14/2025 09:15 AM    Hemoglobin 14.2 05/14/2025 09:15 AM    Hematocrit 45.0 05/14/2025 09:15 AM    MCV 98 05/14/2025 09:15 AM    MCH 30.9 05/14/2025 09:15 AM    RDW 13.1 05/14/2025 09:15 AM    Platelets 452 (H) 05/14/2025 09:15 AM    Lymphocytes % 27 05/14/2025 09:15 AM    Monocytes % 1 (L) 05/14/2025 09:15 AM    Eosinophils % 2 05/14/2025 09:15 AM    Basophils % 1 05/14/2025 09:15 AM     Lab Results   Component Value Date/Time    Potassium 4.3 05/14/2025 09:15 AM    Chloride 96 05/14/2025 09:15 AM    CO2 29 05/14/2025 09:15 AM    BUN 34 (H) 05/14/2025 09:15 AM    Creatinine 0.96 05/14/2025 09:15 AM    Glucose, Fasting 131 (H) 05/14/2025 09:15 AM    Calcium 10.1 05/14/2025 09:15 AM    AST 32 05/14/2025 09:15 AM    ALT 84 (H) 05/14/2025 09:15 AM    Alkaline Phosphatase 386 (H) 05/14/2025 09:15 AM    Total Protein 8.6 (H) 05/14/2025 09:15 AM    Albumin 4.2 05/14/2025 09:15 AM    Total Bilirubin 0.84 05/14/2025 09:15 AM    eGFR 68 05/14/2025 09:15 AM       Radiology Results Review: I have reviewed radiology reports from [facility] scans including: CT abdomen/pelvis, CT head, Ultrasound(s), and Mammogram.    Administrative Statements   I have spent a total time of  75 minutes in caring for this patient on the day of the visit/encounter including Diagnostic results, Prognosis, Risks and benefits of tx options, Instructions for management, Patient and family education, Importance of tx compliance, Risk factor reductions, Impressions, Counseling / Coordination of care, Documenting in the medical record, Reviewing/placing orders in the medical record (including tests, medications, and/or procedures), Obtaining or reviewing history  , and Communicating with other healthcare professionals .

## 2025-06-18 NOTE — PROGRESS NOTES
MRO request sent-complete    [facility] ([facility])  Dr.Justin Hill - Oncology      [id number], [place], [address]    PHONE : [phone]    FAX: [phone]

## 2025-06-18 NOTE — ASSESSMENT & PLAN NOTE
In summary patient is a 49-year-old postmenopausal woman with past medical history of an ER/WV positive right breast cancer status postlumpectomy radiation and about 1-1/2 to 2 years of adjuvant treatment with tamoxifen in 2014 currently presenting with metastatic disease involving liver and the peritoneum with a negative bone scan with ER90% WV 20% HER2/timbo negative/0 breast cancer.  Patient had her genetic testing done this morning.  Caris testing is pending on the tumor tissue.    Had an extensive discussion with the patient and her mother about the diagnosis, staging, prognosis and overall management plan.  Discussion of decision making  Oncology history updated, accordingly, during this visit  Goals of care/patient communication  I discussed with the patient the clinical course leading up to their cancer diagnosis. I reviewed relevant office notes, imaging reports and pathology result as well.  I told the patient that this is now stage IV metastatic breast cancer which is unfortunately incurable however very treatable with overall good outcomes.  . We discussed that the goal of anti-cancer therapy is to provide best quality of life, extend overall survival, and progression free survival as shown in clinical trials.  Discussion as per Erendira Chavez to and Erendira Bates clinical trials clinical trials regarding use of Ribociclib along with aromatase inhibitor with letrozole.  As she has had no vaginal bleeding or spotting for more than 2 years she would technically be menopausal at this time.    We also discussed the role of palliative care and management and supportive care referral has been made she is yet to see them.  I explained the risks/benefits of the proposed cancer therapy: Ribociclib and letrozole and after discussion including understanding risks of possible life-threatening complications and therapy-related malignancy development, informed consent has been signed and obtained  Discussed about the  monitoring needed and the expected side effects related to both aromatase inhibitor with hot flashes, vasomotor side effects, arthralgias, cardiovascular risks, risk related to bone health and monitoring needed.  And also related to the use of Ribociclib with EKG monitoring needed for side effects related to QTc prolongation, liver abnormalities, ILD, fatigue, myelosuppression with need for monitoring of blood counts and liver function tests.  I did  her that if there is any concern for compromise of her organ function something which is called visceral crisis related to her significant liver metastatic disease burden she may be also considered for chemotherapy for initial faster response and tumor debulking.  Hence we will be sending her for some blood work today including uric acid as well.  Patient is currently deciding whether she will be staying around here or move to [place] to be closer to family.  She is already made an appointment with an oncologist there and may be seeking a second opinion.  At some point we will also send her for bone density.  She will be doing EKG monitoring before she starts Ribociclib and then 2 weeks of starting it and then after 4 weeks of starting it.  She will also be doing blood work as directed.  I will be seeing her for ongoing imaging and blood work.  . Orders:  •  letrozole (FEMARA) 2.5 mg tablet; Take 1 tablet (2.5 mg total) by mouth daily  •  CBC and differential; Future  •  Comprehensive metabolic panel; Future  •  ECG 12 lead; Future  •  Cancer antigen 27.29; Future  •  CEA; Future  •  oxyCODONE (Roxicodone) 5 immediate release tablet; Take 1 tablet (5 mg total) by mouth every 8 (eight) hours as needed for moderate pain or severe pain Max Daily Amount: 15 mg  •  Uric acid; Future

## 2025-06-18 NOTE — TELEPHONE ENCOUNTER
[facility] ([facility])    Dr.Justin Hill - Oncology      [address], [place], [address]    PHONE : [phone]    FAX: [phone]

## 2025-06-19 ENCOUNTER — TELEPHONE (OUTPATIENT)
Dept: HEMATOLOGY ONCOLOGY | Facility: CLINIC | Age: 50
End: 2025-06-19

## 2025-06-19 DIAGNOSIS — K76.9 LIVER LESION: ICD-10-CM

## 2025-06-19 DIAGNOSIS — C78.6 PERITONEAL CARCINOMATOSIS (HCC): ICD-10-CM

## 2025-06-19 DIAGNOSIS — C50.919 STAGE IV BREAST CANCER IN FEMALE (HCC): Primary | ICD-10-CM

## 2025-06-19 LAB — CANCER AG27-29 SERPL-ACNC: 2697.9 U/ML (ref 0–38.6)

## 2025-06-25 ENCOUNTER — DOCUMENTATION (OUTPATIENT)
Dept: HEMATOLOGY ONCOLOGY | Facility: CLINIC | Age: 50
End: 2025-06-25

## 2025-06-25 ENCOUNTER — PATIENT OUTREACH (OUTPATIENT)
Dept: HEMATOLOGY ONCOLOGY | Facility: CLINIC | Age: 50
End: 2025-06-25

## 2025-06-25 NOTE — PROGRESS NOTES
Breast pathology case list for the month of June reviewed for [facility] presentation. Patient's case was not presented.

## 2025-06-25 NOTE — PROGRESS NOTES
I reached out to Marxia  to  review for any barriers to care and to offer any supportive services that may be needed. I left VM with the reason for my call including my direct phone number   I will follow up with her as needed .

## 2025-07-01 LAB
GENE DIS ANL INTERP-IMP: NORMAL
INTERPRETATION: NORMAL

## 2025-07-02 ENCOUNTER — PATIENT OUTREACH (OUTPATIENT)
Dept: CASE MANAGEMENT | Facility: HOSPITAL | Age: 50
End: 2025-07-02

## 2025-07-02 NOTE — PROGRESS NOTES
ANIKA s/w pt by phone this morning to check in since our last conversation.  She tells me that her consult with Dr. Conn went very well and she appreciated how much time and attention to detail went into the appointment.  She shared that she is feeling well after just travelling to [place] to watch her son graduate from the [employer].  She says that she was so happy and emotional to see him graduate and that they had a great trip overall.  She is now in [place] with her family and will see an oncologist there next week.  She says that if her family wasn't all there she wouldn't even be seeking another opinion, however since that is where the majority of her support is we agreed that it makes sense to have an appointment down there as well.  She is hoping to decide where to do her tx next week and agrees to call the office and let us know either way.  I have also encouraged her to reach out to me as needed for support, questions, or concerns.  She was very appreciative of the call to check in this morning and denies any other needs or concerns at this time.

## 2025-07-15 ENCOUNTER — TELEPHONE (OUTPATIENT)
Dept: HEMATOLOGY ONCOLOGY | Facility: CLINIC | Age: 50
End: 2025-07-15

## 2025-07-16 ENCOUNTER — PATIENT OUTREACH (OUTPATIENT)
Dept: CASE MANAGEMENT | Facility: HOSPITAL | Age: 50
End: 2025-07-16

## 2025-07-16 NOTE — PROGRESS NOTES
Chart review indicates that pt has decided to have her tx in [place] closer to her family.  Closing OncSW episode at this time, but will remain available to pt should her situation change in the future.